# Patient Record
Sex: FEMALE | Race: WHITE | URBAN - METROPOLITAN AREA
[De-identification: names, ages, dates, MRNs, and addresses within clinical notes are randomized per-mention and may not be internally consistent; named-entity substitution may affect disease eponyms.]

---

## 2018-04-26 ENCOUNTER — APPOINTMENT (OUTPATIENT)
Dept: GENERAL RADIOLOGY | Facility: CLINIC | Age: 53
DRG: 871 | End: 2018-04-26
Attending: EMERGENCY MEDICINE
Payer: MEDICARE

## 2018-04-26 ENCOUNTER — HOSPITAL ENCOUNTER (INPATIENT)
Facility: CLINIC | Age: 53
LOS: 4 days | Discharge: HOME | DRG: 871 | End: 2018-04-30
Attending: EMERGENCY MEDICINE | Admitting: INTERNAL MEDICINE
Payer: MEDICARE

## 2018-04-26 DIAGNOSIS — J44.1 COPD EXACERBATION (H): ICD-10-CM

## 2018-04-26 DIAGNOSIS — J18.9 PNEUMONIA OF BOTH LUNGS DUE TO INFECTIOUS ORGANISM, UNSPECIFIED PART OF LUNG: ICD-10-CM

## 2018-04-26 DIAGNOSIS — J96.01 ACUTE RESPIRATORY FAILURE WITH HYPOXIA (H): ICD-10-CM

## 2018-04-26 LAB
ANION GAP SERPL CALCULATED.3IONS-SCNC: 10 MMOL/L (ref 3–14)
BASE EXCESS BLDA CALC-SCNC: 5.1 MMOL/L
BASOPHILS # BLD AUTO: 0 10E9/L (ref 0–0.2)
BASOPHILS NFR BLD AUTO: 0 %
BUN SERPL-MCNC: 12 MG/DL (ref 7–30)
CALCIUM SERPL-MCNC: 8.7 MG/DL (ref 8.5–10.1)
CHLORIDE SERPL-SCNC: 96 MMOL/L (ref 94–109)
CO2 SERPL-SCNC: 31 MMOL/L (ref 20–32)
CREAT SERPL-MCNC: 0.68 MG/DL (ref 0.52–1.04)
DIFFERENTIAL METHOD BLD: ABNORMAL
EOSINOPHIL # BLD AUTO: 0 10E9/L (ref 0–0.7)
EOSINOPHIL NFR BLD AUTO: 0 %
ERYTHROCYTE [DISTWIDTH] IN BLOOD BY AUTOMATED COUNT: 13.7 % (ref 10–15)
GFR SERPL CREATININE-BSD FRML MDRD: 89 ML/MIN/1.7M2
GLUCOSE SERPL-MCNC: 87 MG/DL (ref 70–99)
HCO3 BLD-SCNC: 31 MMOL/L (ref 21–28)
HCT VFR BLD AUTO: 40.6 % (ref 35–47)
HGB BLD-MCNC: 13.7 G/DL (ref 11.7–15.7)
LACTATE BLD-SCNC: 0.4 MMOL/L (ref 0.7–2)
LACTATE BLD-SCNC: 2.5 MMOL/L (ref 0.7–2)
LYMPHOCYTES # BLD AUTO: 3.2 10E9/L (ref 0.8–5.3)
LYMPHOCYTES NFR BLD AUTO: 16 %
MCH RBC QN AUTO: 31.4 PG (ref 26.5–33)
MCHC RBC AUTO-ENTMCNC: 33.7 G/DL (ref 31.5–36.5)
MCV RBC AUTO: 93 FL (ref 78–100)
METAMYELOCYTES # BLD: 0.2 10E9/L
METAMYELOCYTES NFR BLD MANUAL: 1 %
MONOCYTES # BLD AUTO: 1.2 10E9/L (ref 0–1.3)
MONOCYTES NFR BLD AUTO: 6 %
NEUTROPHILS # BLD AUTO: 15.2 10E9/L (ref 1.6–8.3)
NEUTROPHILS NFR BLD AUTO: 77 %
O2/TOTAL GAS SETTING VFR VENT: 6 %
OXYHGB MFR BLD: 87 % (ref 92–100)
PCO2 BLD: 48 MM HG (ref 35–45)
PH BLD: 7.41 PH (ref 7.35–7.45)
PLATELET # BLD AUTO: 680 10E9/L (ref 150–450)
PLATELET # BLD EST: ABNORMAL 10*3/UL
PO2 BLD: 54 MM HG (ref 80–105)
POTASSIUM SERPL-SCNC: 3.7 MMOL/L (ref 3.4–5.3)
PROCALCITONIN SERPL-MCNC: 0.11 NG/ML
RBC # BLD AUTO: 4.36 10E12/L (ref 3.8–5.2)
RBC MORPH BLD: ABNORMAL
SODIUM SERPL-SCNC: 137 MMOL/L (ref 133–144)
TROPONIN I SERPL-MCNC: <0.015 UG/L (ref 0–0.04)
WBC # BLD AUTO: 19.8 10E9/L (ref 4–11)

## 2018-04-26 PROCEDURE — 83605 ASSAY OF LACTIC ACID: CPT | Performed by: EMERGENCY MEDICINE

## 2018-04-26 PROCEDURE — 40000275 ZZH STATISTIC RCP TIME EA 10 MIN

## 2018-04-26 PROCEDURE — 99285 EMERGENCY DEPT VISIT HI MDM: CPT | Mod: 25

## 2018-04-26 PROCEDURE — 94640 AIRWAY INHALATION TREATMENT: CPT | Mod: 76

## 2018-04-26 PROCEDURE — 84484 ASSAY OF TROPONIN QUANT: CPT | Performed by: EMERGENCY MEDICINE

## 2018-04-26 PROCEDURE — 25000125 ZZHC RX 250: Performed by: EMERGENCY MEDICINE

## 2018-04-26 PROCEDURE — 36600 WITHDRAWAL OF ARTERIAL BLOOD: CPT

## 2018-04-26 PROCEDURE — 83605 ASSAY OF LACTIC ACID: CPT | Performed by: INTERNAL MEDICINE

## 2018-04-26 PROCEDURE — 85025 COMPLETE CBC W/AUTO DIFF WBC: CPT | Performed by: EMERGENCY MEDICINE

## 2018-04-26 PROCEDURE — 36415 COLL VENOUS BLD VENIPUNCTURE: CPT | Performed by: EMERGENCY MEDICINE

## 2018-04-26 PROCEDURE — 96375 TX/PRO/DX INJ NEW DRUG ADDON: CPT

## 2018-04-26 PROCEDURE — 96365 THER/PROPH/DIAG IV INF INIT: CPT

## 2018-04-26 PROCEDURE — 36415 COLL VENOUS BLD VENIPUNCTURE: CPT | Performed by: INTERNAL MEDICINE

## 2018-04-26 PROCEDURE — 87040 BLOOD CULTURE FOR BACTERIA: CPT | Performed by: EMERGENCY MEDICINE

## 2018-04-26 PROCEDURE — 12000000 ZZH R&B MED SURG/OB

## 2018-04-26 PROCEDURE — 25000125 ZZHC RX 250: Performed by: INTERNAL MEDICINE

## 2018-04-26 PROCEDURE — 84145 PROCALCITONIN (PCT): CPT | Performed by: EMERGENCY MEDICINE

## 2018-04-26 PROCEDURE — 96366 THER/PROPH/DIAG IV INF ADDON: CPT

## 2018-04-26 PROCEDURE — 94640 AIRWAY INHALATION TREATMENT: CPT

## 2018-04-26 PROCEDURE — 25000128 H RX IP 250 OP 636: Performed by: EMERGENCY MEDICINE

## 2018-04-26 PROCEDURE — 40000274 ZZH STATISTIC RCP CONSULT EA 30 MIN

## 2018-04-26 PROCEDURE — 25000128 H RX IP 250 OP 636: Performed by: INTERNAL MEDICINE

## 2018-04-26 PROCEDURE — 82805 BLOOD GASES W/O2 SATURATION: CPT | Performed by: INTERNAL MEDICINE

## 2018-04-26 PROCEDURE — 87631 RESP VIRUS 3-5 TARGETS: CPT | Performed by: INTERNAL MEDICINE

## 2018-04-26 PROCEDURE — 71046 X-RAY EXAM CHEST 2 VIEWS: CPT

## 2018-04-26 PROCEDURE — 80048 BASIC METABOLIC PNL TOTAL CA: CPT | Performed by: EMERGENCY MEDICINE

## 2018-04-26 PROCEDURE — 99223 1ST HOSP IP/OBS HIGH 75: CPT | Mod: AI | Performed by: INTERNAL MEDICINE

## 2018-04-26 RX ORDER — ONDANSETRON 2 MG/ML
4 INJECTION INTRAMUSCULAR; INTRAVENOUS EVERY 6 HOURS PRN
Status: DISCONTINUED | OUTPATIENT
Start: 2018-04-26 | End: 2018-04-30 | Stop reason: HOSPADM

## 2018-04-26 RX ORDER — ALBUTEROL SULFATE 90 UG/1
1-2 AEROSOL, METERED RESPIRATORY (INHALATION) EVERY 4 HOURS PRN
Status: ON HOLD | COMMUNITY
End: 2018-04-30

## 2018-04-26 RX ORDER — METHYLPREDNISOLONE SODIUM SUCCINATE 125 MG/2ML
125 INJECTION, POWDER, LYOPHILIZED, FOR SOLUTION INTRAMUSCULAR; INTRAVENOUS ONCE
Status: COMPLETED | OUTPATIENT
Start: 2018-04-26 | End: 2018-04-26

## 2018-04-26 RX ORDER — PROCHLORPERAZINE MALEATE 5 MG
10 TABLET ORAL EVERY 6 HOURS PRN
Status: DISCONTINUED | OUTPATIENT
Start: 2018-04-26 | End: 2018-04-30 | Stop reason: HOSPADM

## 2018-04-26 RX ORDER — NALOXONE HYDROCHLORIDE 0.4 MG/ML
.1-.4 INJECTION, SOLUTION INTRAMUSCULAR; INTRAVENOUS; SUBCUTANEOUS
Status: DISCONTINUED | OUTPATIENT
Start: 2018-04-26 | End: 2018-04-30 | Stop reason: HOSPADM

## 2018-04-26 RX ORDER — CEFTRIAXONE 1 G/1
1 INJECTION, POWDER, FOR SOLUTION INTRAMUSCULAR; INTRAVENOUS ONCE
Status: COMPLETED | OUTPATIENT
Start: 2018-04-26 | End: 2018-04-26

## 2018-04-26 RX ORDER — SODIUM CHLORIDE 9 MG/ML
INJECTION, SOLUTION INTRAVENOUS CONTINUOUS
Status: DISCONTINUED | OUTPATIENT
Start: 2018-04-26 | End: 2018-04-30 | Stop reason: HOSPADM

## 2018-04-26 RX ORDER — PROCHLORPERAZINE 25 MG
25 SUPPOSITORY, RECTAL RECTAL EVERY 12 HOURS PRN
Status: DISCONTINUED | OUTPATIENT
Start: 2018-04-26 | End: 2018-04-30 | Stop reason: HOSPADM

## 2018-04-26 RX ORDER — ONDANSETRON 4 MG/1
4 TABLET, ORALLY DISINTEGRATING ORAL EVERY 6 HOURS PRN
Status: DISCONTINUED | OUTPATIENT
Start: 2018-04-26 | End: 2018-04-30 | Stop reason: HOSPADM

## 2018-04-26 RX ORDER — ACETAMINOPHEN 325 MG/1
650 TABLET ORAL EVERY 4 HOURS PRN
Status: DISCONTINUED | OUTPATIENT
Start: 2018-04-26 | End: 2018-04-30 | Stop reason: HOSPADM

## 2018-04-26 RX ORDER — IPRATROPIUM BROMIDE AND ALBUTEROL SULFATE 2.5; .5 MG/3ML; MG/3ML
6 SOLUTION RESPIRATORY (INHALATION) ONCE
Status: COMPLETED | OUTPATIENT
Start: 2018-04-26 | End: 2018-04-26

## 2018-04-26 RX ORDER — OXYCODONE HYDROCHLORIDE 5 MG/1
5-10 TABLET ORAL
Status: DISCONTINUED | OUTPATIENT
Start: 2018-04-26 | End: 2018-04-27 | Stop reason: ALTCHOICE

## 2018-04-26 RX ORDER — ALBUTEROL SULFATE 0.83 MG/ML
3 SOLUTION RESPIRATORY (INHALATION)
Status: DISCONTINUED | OUTPATIENT
Start: 2018-04-26 | End: 2018-04-30 | Stop reason: HOSPADM

## 2018-04-26 RX ORDER — IPRATROPIUM BROMIDE AND ALBUTEROL SULFATE 2.5; .5 MG/3ML; MG/3ML
3 SOLUTION RESPIRATORY (INHALATION) 4 TIMES DAILY
Status: DISCONTINUED | OUTPATIENT
Start: 2018-04-26 | End: 2018-04-30 | Stop reason: HOSPADM

## 2018-04-26 RX ORDER — FLUTICASONE PROPIONATE 50 MCG
2 SPRAY, SUSPENSION (ML) NASAL 2 TIMES DAILY PRN
COMMUNITY

## 2018-04-26 RX ORDER — HYDROMORPHONE HYDROCHLORIDE 1 MG/ML
.3-.5 INJECTION, SOLUTION INTRAMUSCULAR; INTRAVENOUS; SUBCUTANEOUS
Status: DISCONTINUED | OUTPATIENT
Start: 2018-04-26 | End: 2018-04-30 | Stop reason: HOSPADM

## 2018-04-26 RX ORDER — SODIUM CHLORIDE 9 MG/ML
1000 INJECTION, SOLUTION INTRAVENOUS CONTINUOUS
Status: DISCONTINUED | OUTPATIENT
Start: 2018-04-26 | End: 2018-04-26

## 2018-04-26 RX ORDER — AZITHROMYCIN 250 MG/1
250 TABLET, FILM COATED ORAL DAILY
Status: COMPLETED | OUTPATIENT
Start: 2018-04-27 | End: 2018-04-30

## 2018-04-26 RX ORDER — OXYCODONE AND ACETAMINOPHEN 10; 325 MG/1; MG/1
1 TABLET ORAL EVERY 8 HOURS PRN
COMMUNITY

## 2018-04-26 RX ORDER — ALBUTEROL SULFATE 0.83 MG/ML
2.5 SOLUTION RESPIRATORY (INHALATION)
Status: DISCONTINUED | OUTPATIENT
Start: 2018-04-26 | End: 2018-04-26

## 2018-04-26 RX ADMIN — METHYLPREDNISOLONE SODIUM SUCCINATE 125 MG: 125 INJECTION, POWDER, FOR SOLUTION INTRAMUSCULAR; INTRAVENOUS at 18:00

## 2018-04-26 RX ADMIN — SODIUM CHLORIDE 1000 ML: 9 INJECTION, SOLUTION INTRAVENOUS at 20:00

## 2018-04-26 RX ADMIN — IPRATROPIUM BROMIDE AND ALBUTEROL SULFATE 6 ML: .5; 3 SOLUTION RESPIRATORY (INHALATION) at 17:09

## 2018-04-26 RX ADMIN — CEFTRIAXONE SODIUM 1 G: 1 INJECTION, POWDER, FOR SOLUTION INTRAMUSCULAR; INTRAVENOUS at 18:00

## 2018-04-26 RX ADMIN — ENOXAPARIN SODIUM 40 MG: 40 INJECTION SUBCUTANEOUS at 22:36

## 2018-04-26 RX ADMIN — AZITHROMYCIN MONOHYDRATE 500 MG: 500 INJECTION, POWDER, LYOPHILIZED, FOR SOLUTION INTRAVENOUS at 17:22

## 2018-04-26 RX ADMIN — SODIUM CHLORIDE 1500 ML: 9 INJECTION, SOLUTION INTRAVENOUS at 21:37

## 2018-04-26 RX ADMIN — SODIUM CHLORIDE 1000 ML: 9 INJECTION, SOLUTION INTRAVENOUS at 17:04

## 2018-04-26 RX ADMIN — CEFEPIME HYDROCHLORIDE 2 G: 2 INJECTION, POWDER, FOR SOLUTION INTRAVENOUS at 22:35

## 2018-04-26 RX ADMIN — IPRATROPIUM BROMIDE AND ALBUTEROL SULFATE 3 ML: .5; 3 SOLUTION RESPIRATORY (INHALATION) at 22:08

## 2018-04-26 NOTE — IP AVS SNAPSHOT
[place] [address] [facility]    [address]    [facility] [address]    Phone:  [phone]    Fax:  [phone]                                       After Visit Summary   4/26/2018    Nayeli Blas    MRN: [id number]           After Visit Summary Signature Page     I have received my discharge instructions, and my questions have been answered. I have discussed any challenges I see with this plan with the nurse or doctor.    ..........................................................................................................................................  Patient/Patient Representative Signature      ..........................................................................................................................................  Patient Representative Print Name and Relationship to Patient    ..................................................               ................................................  Date                                            Time    ..........................................................................................................................................  Reviewed by Signature/Title    ...................................................              ..............................................  Date                                                            Time

## 2018-04-26 NOTE — LETTER
Transition Communication Hand-off for Care Transitions to Next Level of Care Provider    Name: Nayeli Blas  : 1965  MRN #: [id number]  Primary Care Provider: ADDIE BELL  Primary Care MD Name: Addie Bell  Primary Clinic: [facility] [address] [place] [address]  Primary Care [facility] Name: [facility] [place]  Reason for Hospitalization:  COPD exacerbation (H) [J44.1]  Acute respiratory failure with hypoxia (H) [J96.01]  Pneumonia of both lungs due to infectious organism, unspecified part of lung [J18.9]  Admit Date/Time: 2018  3:36 PM  Discharge Date: 18  Payor Source: Payor: MEDICARE / Plan: MEDICARE / Product Type: Medicare /     Readmission Assessment Measure (CHERELLE) Risk Score/category: AVERAGE           Reason for Communication Hand-off Referral: Admission diagnoses: PN  Admission diagnoses: COPD  Non-adherence to plan of care related to:  Other declined smoking cessation    Discharge Plan:       Concern for non-adherence with plan of care:   YES  Discharge Needs Assessment:  Needs       Most Recent Value    # of Referrals Placed by CTS Scheduled Follow-up appointments          Already enrolled in Tele-monitoring program and name of program:  na  Follow-up specialty is recommended: No    Follow-up plan:  No future appointments.    Any outstanding tests or procedures:    Procedures     Future Labs/Procedures    Oxygen Adult     Comments:    [employer] [facility] Order 1-2 liter(s) by nasal cannula with activity with use of portable tank. Expected treatment length is 1 months.. Test on conserving device as applicable.    Patients who qualify for home O2 coverage under the CMS guidelines require ABG tests or O2 sat readings obtained closest to, but no earlier than 2 days prior to the discharge, as evidence of the need for home oxygen therapy. Testing must be performed while patient is in the chronic stable state. See notes for O2 sats.    I certify that this patient, Nayeli  RACH Blas has been under my care and that I, or a nurse practitioner or physician's assistant working with me, had a face-to-face encounter that meets the face-to-face encounter requirements with this patient on 4/30/2018. The patient, Nayeli Blas was evaluated or treated in whole, or in part, for the following medical condition, which necessitates the use of the ordered oxygen. Treatment Diagnosis: Acute respiratory failure with hypoxia. suspect COPD     Attending Provider: Swati Gandara MD  Physician signature: See electronic signature associated with these discharge orders  Date of Order: April 30, 2018              Follow-up and recommended labs and tests        Follow up with primary care provider, ADDIE MALONEY, within 7 days                     Further instructions from your care team      Your hospital follow up appointment has been schedule for you with Dr. Maloney at [facility] for Wednesday 5/9/18 at 1:40pm. Please bring your hospital discharge instructions and any new medications, your insurance cared and photo ID with you to your appointment. Please call the clinic at [phone] if you need to reschedule.     Oxygen Provider:  Arranged through [employer], contact number [phone]. If you have any questions or concerns please call the oxygen company directly       Key Recommendations: pt admitted with pna/copd exacerbation. Pt states she had gone into her PCP clinic when she was first sick but saw an on call doctor and wasn't prescribed any antibiotics. Pt has a history of COPD and is a smoker, although she states she hasn't smoked in about a month and plans to quit. PNA and COPD action plan were reviewed and given to the pt. Pt states she doesn't have any standing orders for antibiotics or steroids for when she has a flare up, maybe this could be a possibility for the future to hopefully prevent another hospital admission? dc'd on [place]  02 through [facility].     Natalie Linda    AVS/Discharge Summary is the source of truth; this is a helpful guide for improved communication of patient story

## 2018-04-26 NOTE — LETTER
Key Recommendations: pt admitted with pna/copd exacerbation. Pt states she had gone into her PCP clinic when she was first sick but saw an on call doctor and wasn't prescribed any antibiotics. Pt has a history of COPD and is a smoker, although she states she hasn't smoked in about a month and plans to quit. PNA and COPD action plan were reviewed and given to the pt. Pt states she doesn't have any standing orders for antibiotics or steroids for when she has a flare up, maybe this could be a possibility for the future to hopefully prevent another hospital admission?    Natalie Linda    AVS/Discharge Summary is the source of truth; this is a helpful guide for improved communication of patient story

## 2018-04-26 NOTE — ED PROVIDER NOTES
History     Chief Complaint:  Cough    HPI   Nayeli Blas is a 52 year old female who presents with her boyfriend for evaluation of cough, shortness of breath, hypoxia.  She has a history of smoking and COPD but is not on any long-term controller medications or baseline oxygen.  She got sick about 10-14 days ago with a productive cough.  She was seen her primary care clinic and at that time had wheezy lung sounds but apparently no clinical exam findings to suggest pneumonia.  She was treated with bronchodilators and cough medications.  She says she initially felt better but that has been getting worse for the past several days.  She has been taking her cough medicine thinking it was an antibiotic.  She was in her doctor's office for another checkup today and was noted to be hypoxic.  They obtained a chest x-ray, findings noted below, but were not able to view the pictures.  Her doctor also started her on oxygen and had her brought to the ER by EMS.    Patient says she she has had an ongoing cough with production of phlegm.  Some chills but no objective fevers.  Mild chest pain with coughing.  No vomiting or diarrhea.  She does have a sore throat from coughing.      CXR PA and Lateral 4/26/18  FINDINGS:  2 views obtained. Emphysematous changes noted throughout bilateral lungs. Superimposed lingular, right middle lobe, and bilateral lung base opacities, worrisome for infection. Throughout the remainder of the bilateral lungs, more-diffuse reticulonodular opacities are identified, worrisome for superimposed respiratory bronchiolitis. No pneumothorax. No Kerley-B lines identified to suggest pulmonary edema. No drainable pleural effusions bilaterally. Heart size stable. No acute fracture.    Allergies:  Sulfa Drugs  Penicillins       Medications:    The patient is not currently taking any prescribed medications.    Past Medical History:    No significant past medical history.     Past Surgical History:     Appendectomy  Cholecystectomy    Family History:    No pertinent family history.    Social History:  Smoking status: Never smoker  Alcohol use: No  Marital Status:  Single      Review of Systems  As noted above, otherwise negative.  No history of immunosuppression or cancer or diabetes.  No hemoptysis. no leg swelling    Physical Exam     Patient Vitals for the past 24 hrs:   BP Temp Temp src Heart Rate Resp SpO2 Weight   04/26/18 1800 124/60 - - 87 19 95 % -   04/26/18 1745 129/58 - - 88 22 94 % -   04/26/18 1730 103/72 - - 87 23 93 % -   04/26/18 1715 102/90 - - 88 28 94 % 75.8 kg (167 lb)   04/26/18 1709 - - - - - 95 % -   04/26/18 1700 120/61 - - 88 23 97 % -   04/26/18 1645 - - - - - 100 % -   04/26/18 1630 (!) 73/43 - - - - 99 % -   04/26/18 1615 - - - - - 96 % -   04/26/18 1600 - - - - - 94 % -   04/26/18 1545 133/78 98.3  F (36.8  C) Oral 93 20 96 % -         Physical Exam  Constitutional:  Appears well-developed and well-nourished. Alert. Conversant. Non toxic.  HENT:   Head: Atraumatic.   Nose: Nose normal.  Mouth/Throat: Oral mucosa is clear and moist. no trismus. Pharynx normal. Tonsils symmetric. No tonsillar enlargement, erythema, or exudate.  Eyes: Conjunctivae normal. EOM normal. Pupils equal, round, and reactive to light. No scleral icterus.   Neck: Normal range of motion. Neck supple. No tracheal deviation present.   Cardiovascular: Normal rate, regular rhythm. No gallop. No friction rub. No murmur heard. Symmetric radial artery pulses   Pulmonary/Chest: Effort normal. No stridor.  On oxygen-5 L and breathing comfortably.  She is able to speak sentences.  She has bilateral apices or wheezes in bilateral basilar coarse rales, I feel slightly more on the right on the left.  Abdominal: Soft. Bowel sounds normal. No distension. No mass. No tenderness. No rebound. No guarding.   Musculoskeletal:   RUE: Normal range of motion. No tenderness. No deformity  LUE: Normal range of motion. No tenderness. No  deformity  RLE: Normal range of motion. No edema. No tenderness. No deformity  LLE: Normal range of motion. No edema. No tenderness. No deformity  Lymph: No cervical adenopathy.   Neurological: Alert and oriented to person, place, and time. Normal strength. CN II-VII intact. No sensory deficit. GCS eye subscore is 4. GCS verbal subscore is 5. GCS motor subscore is 6. Normal coordination   Skin: Skin is warm and dry. No rash noted. No pallor. Normal capillary refill.  Psychiatric:  Normal mood. Normal affect.       Emergency Department Course   Laboratory:  CBC:  WBC 19.8 (H), HGB 13.7,  (H),  BMP: WNL (Creatinine 0.68)  (1612) Troponin I: <0.015  (1612) Lactic Acid: 2.5 (H)  Blood culture #1: pending  Blood culture #2: pending    Interventions:  (1704) Normal Saline, 1 liter, IV bolus  (1709) Albuterol-Ipratropium inhalation solution, 2.5 mg-0.5 mg/3 ml; 6 mL, inhalation  Recheck-still slight wheezing but overall better aeration.  Still has bilateral rales/rhonchi  (1725) Azithromycin, 500 mg, IV infusion  (1800) Ceftriaxone, 1 g, IV infusion  (1800) Solu-Medrol, 125 mg, IV injection    Emergency Department Course:  Nursing notes and vitals reviewed.  I performed an exam of the patient as documented above.  Blood was drawn from the patient. This was sent for laboratory testing, findings above.   1843: I spoke with Dr. Lamar of the Hospitalist service regarding patient's presentation, findings, and plan of care.  1850: Patient rechecked and updated.   Findings and plan explained to the Patient who consents to admission. Discussed the patient with Dr. Lamar, who will admit the patient to a medical bed for further monitoring, evaluation, and treatment.    Impression & Plan    Medical Decision Making:  Nayeli Blas is a 52 year old female sent to the evaluation of pneumonia.  She has had a cough for the past couple of weeks getting progressively worse and was found to be hypoxic in clinic today.     On my  "exam the patient was not really wheezing but also had some focal consolidation.  I felt she likely had a COPD exacerbation overlying her pneumonia.  We treated her COPD with nebulizers and steroids and she somewhat improved although still requiring oxygen.  Labs show elevated white count consistent with an infectious process and mildly elevated lactic acid at 2.5.  Other than hypoxia she was hemodynamically stable and clinically not in distress.  I do note one spurious hypertensive blood pressure reading.  We administered IV fluids, IV Rocephin and azithromycin, obtain blood cultures.  Given her hypoxia she will require admission for ongoing therapy.    We made efforts to get the patient's x-ray from clinic \"pushed\" to our PACS  so we can review the pictures.  Ultimately this was not successful.  I did order an x-ray to be done here in the ER to confirm the diagnosis and make sure we have a baseline in our system.      Diagnosis:    ICD-10-CM    1. Pneumonia of both lungs due to infectious organism, unspecified part of lung J18.9    2. COPD exacerbation (H) J44.1    3. Acute respiratory failure with hypoxia (H) J96.01      Disposition:  Admitted    I, Jesse Dill, am serving as a scribe on 4/26/2018  to personally document services performed by Dr. High based on my observations and the provider's statements to me.       Juan Daniel High  4/26/2018   [facility] EMERGENCY DEPARTMENT       Juan Daniel High MD  04/28/18 0012    "

## 2018-04-26 NOTE — IP AVS SNAPSHOT
MRN:[id number]                      After Visit Summary   4/26/2018    Nayeli Blas    MRN: [id number]           Thank you!     Thank you for choosing [facility] for your care. Our goal is always to provide you with excellent care. Hearing back from our patients is one way we can continue to improve our services. Please take a few minutes to complete the written survey that you may receive in the mail after you visit. If you would like to speak to someone directly about your visit please contact Patient Relations at [phone]. Thank you!          Patient Information     Date Of Birth          1965        Designated Caregiver       Most Recent Value    Caregiver    Will someone help with your care after discharge? yes    Name of designated caregiver Derik Cabrera    Phone number of caregiver [phone]    Caregiver address [address] [facility] [address] [id number]      About your hospital stay     You were admitted on:  April 26, 2018 You last received care in the:  [place] [address] [facility]    You were discharged on:  April 30, 2018        Reason for your hospital stay       Shortness of breath and wheezing                  Who to Call     For medical emergencies, please call 911.  For non-urgent questions about your medical care, please call your primary care provider or clinic, [phone]          Attending Provider     Provider Specialty    Juan Daniel High MD Emergency Medicine    Willard, Vicente Mac MD Internal Medicine    Carlos Mosquera,  Internal Medicine       Primary Care Provider Office Phone # Fax #    Yvette Conroy [facility] [phone]      After Care Instructions     Activity       Your activity upon discharge: activity as tolerated            Diet       Follow this diet upon discharge: Orders Placed This Encounter      Combination Diet Regular Diet Adult            Oxygen Adult       [facility] Order 1-2 liter(s) by  nasal cannula with activity with use of portable tank. Expected treatment length is 1 months.. Test on conserving device as applicable.    Patients who qualify for home O2 coverage under the CMS guidelines require ABG tests or O2 sat readings obtained closest to, but no earlier than 2 days prior to the discharge, as evidence of the need for home oxygen therapy. Testing must be performed while patient is in the chronic stable state. See notes for O2 sats.    I certify that this patient, Nayeli Blas has been under my care and that I, or a nurse practitioner or physician's assistant working with me, had a face-to-face encounter that meets the face-to-face encounter requirements with this patient on 4/30/2018. The patient, Nayeli Blas was evaluated or treated in whole, or in part, for the following medical condition, which necessitates the use of the ordered oxygen. Treatment Diagnosis: Acute respiratory failure with hypoxia. suspect COPD     Attending Provider: Swati Gandara MD  Physician signature: See electronic signature associated with these discharge orders  Date of Order: April 30, 2018                  Follow-up Appointments     Follow-up and recommended labs and tests        Follow up with primary care provider, ADDIE MALONEY, within 7 days                  Further instructions from your care team       Your hospital follow up appointment has been schedule for you with Dr. Maloney at [facility] for Wednesday 5/9/18 at 1:40pm. Please bring your hospital discharge instructions and any new medications, your insurance cared and photo ID with you to your appointment. Please call the clinic at [phone] if you need to reschedule.    Oxygen Provider:  Arranged through [employer], contact number [phone]. If you have any questions or concerns please call the oxygen company directly.      Pending Results     Date and Time Order Name Status  "Description    2018 1730 Blood culture Preliminary     2018 1718 Blood culture Preliminary             Statement of Approval     Ordered          18 2907  I have reviewed and agree with all the recommendations and orders detailed in this document.  EFFECTIVE NOW     Approved and electronically signed by:  Swati Gandara MD             Admission Information     Date & Time Provider Department Dept. Phone    2018 Carlos Mosquera, DO [place] [address] [facility] [phone]      Your Vitals Were     Blood Pressure Pulse Temperature Respirations Height Weight    150/70 (BP Location: Right arm) 87 98  F (36.7  C) (Oral) 18 1.626 m (5' 4\") 77.7 kg (171 lb 4.8 oz)    Pulse Oximetry BMI (Body Mass Index)                92% 29.4 kg/m2          SleepOut Information     SleepOut lets you send messages to your doctor, view your test results, renew your prescriptions, schedule appointments and more. To sign up, go to [email or web address] . Click on \"Log in\" on the left side of the screen, which will take you to the Welcome page. Then click on \"Sign up Now\" on the right side of the page.     You will be asked to enter the access code listed below, as well as some personal information. Please follow the directions to create your username and password.     Your access code is: [email or web address]  Expires: 2018  3:16 PM     Your access code will  in 90 days. If you need help or a new code, please call your [facility] clinic or [phone].        Care EveryWhere ID     This is your Care EveryWhere ID. This could be used by other organizations to access your [facility] medical records  [id number]        Equal Access to Services     [facility]: Hadvania Euceda, wacorazonda luanjaliadaha, qaybta kaalmada patria, destiny nathan. So [facility] [phone].    ATENCIÓN: Si habla español, tiene a adams disposición servicios gratuitos de asistencia lingüística. " Yennifer galaviz [phone].    We comply with applicable federal civil rights laws and [place] laws. We do not discriminate on the basis of race, color, national origin, age, disability, sex, sexual orientation, or gender identity.               Review of your medicines      START taking        Dose / Directions    predniSONE 20 MG tablet   Commonly known as:  DELTASONE   Used for:  Acute respiratory failure with hypoxia (H)        Take  2 tabs (40 mg) daily x 3 days, 1 tab (20 mg) daily x 3 days, then 1/2 tab (10 mg) x 3 days.   Quantity:  11 tablet   Refills:  0         CONTINUE these medicines which have NOT CHANGED        Dose / Directions    albuterol 108 (90 Base) MCG/ACT Inhaler   Commonly known as:  PROAIR HFA/PROVENTIL HFA/VENTOLIN HFA   Used for:  Acute respiratory failure with hypoxia (H)        Dose:  1-2 puff   Inhale 1-2 puffs into the lungs every 4 hours as needed for shortness of breath / dyspnea or wheezing   Quantity:  1 Inhaler   Refills:  0       CELEXA PO        Dose:  30 mg   Take 30 mg by mouth daily   Refills:  0       FLEXERIL PO        Dose:  20 mg   Take 20 mg by mouth At Bedtime   Refills:  0       fluticasone 50 MCG/ACT spray   Commonly known as:  FLONASE        Dose:  2 spray   Spray 2 sprays into both nostrils 2 times daily as needed for rhinitis or allergies   Refills:  0       naloxone nasal spray   Commonly known as:  NARCAN        Dose:  4 mg   Spray 4 mg into one nostril alternating nostrils as needed for opioid reversal every 2-3 minutes until assistance arrives   Refills:  0       oxyCODONE-acetaminophen  MG per tablet   Commonly known as:  PERCOCET        Dose:  1 tablet   Take 1 tablet by mouth every 8 hours as needed for severe pain   Refills:  0       PRILOSEC PO        Dose:  20 mg   Take 20 mg by mouth every morning   Refills:  0       SEROQUEL PO        Dose:  400 mg   Take 400 mg by mouth At Bedtime   Refills:  0       XANAX PO        Dose:  0.5 mg   Take 0.5 mg by  mouth 4 times daily as needed for anxiety   Refills:  0            Where to get your medicines      These medications were sent to [facility] [place], [place] - [address]  [address] [address]     Phone:  [phone]     albuterol 108 (90 Base) MCG/ACT Inhaler    predniSONE 20 MG tablet                Protect others around you: Learn how to safely use, store and throw away your medicines at www.disposemymeds.org.        Information about OPIOIDS     PRESCRIPTION OPIOIDS: WHAT YOU NEED TO KNOW   You have a prescription for an opioid (narcotic) pain medicine. Opioids can cause addiction. If you have a history of chemical dependency of any type, you are at a higher risk of becoming addicted to opioids. Only take this medicine after all other options have been tried. Take it for as short a time and as few doses as possible.     Do not:    Drive. If you drive while taking these medicines, you could be arrested for driving under the influence (DUI).    Operate heavy machinery    Do any other dangerous activities while taking these medicines.     Drink any alcohol while taking these medicines.      Take with any other medicines that contain acetaminophen. Read all labels carefully. Look for the word  acetaminophen  or  Tylenol.  Ask your pharmacist if you have questions or are unsure.    Store your pills in a secure place, locked if possible. We will not replace any lost or stolen medicine. If you don t finish your medicine, please throw away (dispose) as directed by your pharmacist. The [place] Pollution Control Agency has more information about safe disposal: https://www.pca.[facility].[place].us/living-green/managing-unwanted-medications    All opioids tend to cause constipation. Drink plenty of water and eat foods that have a lot of fiber, such as fruits, vegetables, prune juice, apple juice and high-fiber cereal. Take a laxative (Miralax, milk of magnesia, Colace, Senna) if you don t move your bowels  at least every other day.              Medication List: This is a list of all your medications and when to take them. Check marks below indicate your daily home schedule. Keep this list as a reference.      Medications           Morning Afternoon Evening Bedtime As Needed    albuterol 108 (90 Base) MCG/ACT Inhaler   Commonly known as:  PROAIR HFA/PROVENTIL HFA/VENTOLIN HFA   Inhale 1-2 puffs into the lungs every 4 hours as needed for shortness of breath / dyspnea or wheezing                                CELEXA PO   Take 30 mg by mouth daily                                FLEXERIL PO   Take 20 mg by mouth At Bedtime   Last time this was given:  10 mg on 4/29/2018 10:29 PM                                fluticasone 50 MCG/ACT spray   Commonly known as:  FLONASE   Spray 2 sprays into both nostrils 2 times daily as needed for rhinitis or allergies                                naloxone nasal spray   Commonly known as:  NARCAN   Spray 4 mg into one nostril alternating nostrils as needed for opioid reversal every 2-3 minutes until assistance arrives                                oxyCODONE-acetaminophen  MG per tablet   Commonly known as:  PERCOCET   Take 1 tablet by mouth every 8 hours as needed for severe pain   Last time this was given:  1 tablet on 4/30/2018  4:30 PM                                predniSONE 20 MG tablet   Commonly known as:  DELTASONE   Take  2 tabs (40 mg) daily x 3 days, 1 tab (20 mg) daily x 3 days, then 1/2 tab (10 mg) x 3 days.   Last time this was given:  40 mg on 4/30/2018  8:22 AM                                PRILOSEC PO   Take 20 mg by mouth every morning   Last time this was given:  20 mg on 4/30/2018  8:23 AM                                SEROQUEL PO   Take 400 mg by mouth At Bedtime   Last time this was given:  400 mg on 4/29/2018 10:29 PM                                XANAX PO   Take 0.5 mg by mouth 4 times daily as needed for anxiety   Last time this was given:  0.5 mg on  4/30/2018  4:28 PM

## 2018-04-26 NOTE — ED NOTES
[facility]  ED Nurse Handoff Report    Nayeli Blas is a 52 year old female   ED Chief complaint: Cough (Pneumonia and SOB)  . ED Diagnosis:   Final diagnoses:   Pneumonia of both lungs due to infectious organism, unspecified part of lung   COPD exacerbation (H)   Acute respiratory failure with hypoxia (H)     Allergies:   Allergies   Allergen Reactions     Sulfa Drugs Anaphylaxis     Penicillins Rash       Code Status: Full Code  Activity level - Baseline/Home:  Independent. Activity Level - Current:   Independent. Lift room needed: No. Bariatric: No   Needed: No   Isolation: No. Infection: Not Applicable  Other .     Vital Signs:   Vitals:    04/26/18 1715 04/26/18 1730 04/26/18 1745 04/26/18 1800   BP: 102/90 103/72 129/58 124/60   Resp: 28 23 22 19   Temp:       TempSrc:       SpO2: 94% 93% 94% 95%   Weight: 75.8 kg (167 lb)          Cardiac Rhythm:  ,    Reg  Pain level:  zero  Patient confused: No. Patient Falls Risk: Yes.   Elimination Status: Able to void   Patient Report - Initial Complaint: SOB, ALEGRIA, Cough.   Focused Assessment:  Nayeli Blas is a 52 year old female who presents with SOB, ALEGRIA, and was seen in clinic last week for possibility of pneumonia.  No abx prescribed at that time.  Now arrives to ED today for worsening cough, SOB, ALEGRIA, and general malaise.  Tests Performed:   XR Chest 2 Views    (Results Pending)   . Abnormal Results:   Labs Ordered and Resulted from Time of ED Arrival Up to the Time of Departure from the ED   CBC WITH PLATELETS DIFFERENTIAL - Abnormal; Notable for the following:        Result Value    WBC 19.8 (*)     Platelet Count 680 (*)     Absolute Neutrophil 15.2 (*)     Absolute Metamyelocytes 0.2 (*)     All other components within normal limits   LACTIC ACID WHOLE BLOOD - Abnormal; Notable for the following:     Lactic Acid 2.5 (*)     All other components within normal limits   BASIC METABOLIC PANEL   TROPONIN I   BLOOD GAS ARTERIAL AND  OXYHGB   PERIPHERAL IV CATHETER   BLOOD CULTURE   BLOOD CULTURE   .   Treatments provided: Abx, Fluids  Family Comments: Spouse at bedside, supportive  OBS brochure/video discussed/provided to patient:  N/A  ED Medications:   Medications   0.9% sodium chloride BOLUS (1,000 mLs Intravenous New Bag 4/26/18 1704)     Followed by   sodium chloride 0.9% infusion (not administered)   albuterol neb solution 2.5 mg (not administered)   ipratropium - albuterol 0.5 mg/2.5 mg/3 mL (DUONEB) neb solution 6 mL (6 mLs Nebulization Given 4/26/18 1709)   methylPREDNISolone sodium succinate (solu-MEDROL) injection 125 mg (125 mg Intravenous Given 4/26/18 1800)   cefTRIAXone (ROCEPHIN) 1 g vial to attach to  mL bag for ADULTS or NS 50 mL bag for PEDS (1 g Intravenous New Bag 4/26/18 1800)   azithromycin (ZITHROMAX) 500 mg in sodium chloride 0.9 % 250 mL intermittent infusion (500 mg Intravenous Canceled Entry 4/26/18 1725)     Drips infusing:  Yes  For the majority of the shift, the patient's behavior Green.     Severe Sepsis OR Septic Shock Diagnosis Present: No      ED Nurse Name/Phone Number: Denisevenkata Hong,   6:38 PM    RECEIVING UNIT ED HANDOFF REVIEW    Above ED Nurse Handoff Report was reviewed: Yes  Reviewed by: Tami Means on April 26, 2018 at 7:26 PM

## 2018-04-26 NOTE — ED TRIAGE NOTES
Pt dx with pneumonia at [facility] ~ one week ago.  Saw Dr. Cornell, started on abx.  Over the course of this week, pt experienced increased SOB, ALEGRIA, and general malaise.  Returned to ED today for difficulty breathing.

## 2018-04-27 LAB
ANION GAP SERPL CALCULATED.3IONS-SCNC: 8 MMOL/L (ref 3–14)
BUN SERPL-MCNC: 12 MG/DL (ref 7–30)
CALCIUM SERPL-MCNC: 8 MG/DL (ref 8.5–10.1)
CHLORIDE SERPL-SCNC: 105 MMOL/L (ref 94–109)
CO2 SERPL-SCNC: 26 MMOL/L (ref 20–32)
CREAT SERPL-MCNC: 0.54 MG/DL (ref 0.52–1.04)
ERYTHROCYTE [DISTWIDTH] IN BLOOD BY AUTOMATED COUNT: 14 % (ref 10–15)
FLUAV+FLUBV RNA SPEC QL NAA+PROBE: NEGATIVE
FLUAV+FLUBV RNA SPEC QL NAA+PROBE: NEGATIVE
GFR SERPL CREATININE-BSD FRML MDRD: >90 ML/MIN/1.7M2
GLUCOSE SERPL-MCNC: 113 MG/DL (ref 70–99)
GRAM STN SPEC: NORMAL
HCT VFR BLD AUTO: 37.9 % (ref 35–47)
HGB BLD-MCNC: 12.6 G/DL (ref 11.7–15.7)
Lab: NORMAL
MCH RBC QN AUTO: 32.1 PG (ref 26.5–33)
MCHC RBC AUTO-ENTMCNC: 33.2 G/DL (ref 31.5–36.5)
MCV RBC AUTO: 96 FL (ref 78–100)
PLATELET # BLD AUTO: 594 10E9/L (ref 150–450)
POTASSIUM SERPL-SCNC: 4.3 MMOL/L (ref 3.4–5.3)
RBC # BLD AUTO: 3.93 10E12/L (ref 3.8–5.2)
RSV RNA SPEC NAA+PROBE: NEGATIVE
SODIUM SERPL-SCNC: 139 MMOL/L (ref 133–144)
SPECIMEN SOURCE: NORMAL
SPECIMEN SOURCE: NORMAL
WBC # BLD AUTO: 18 10E9/L (ref 4–11)

## 2018-04-27 PROCEDURE — 25000132 ZZH RX MED GY IP 250 OP 250 PS 637: Mod: GY | Performed by: INTERNAL MEDICINE

## 2018-04-27 PROCEDURE — 12000000 ZZH R&B MED SURG/OB

## 2018-04-27 PROCEDURE — 94640 AIRWAY INHALATION TREATMENT: CPT

## 2018-04-27 PROCEDURE — 80048 BASIC METABOLIC PNL TOTAL CA: CPT | Performed by: INTERNAL MEDICINE

## 2018-04-27 PROCEDURE — A9270 NON-COVERED ITEM OR SERVICE: HCPCS | Mod: GY | Performed by: INTERNAL MEDICINE

## 2018-04-27 PROCEDURE — 94640 AIRWAY INHALATION TREATMENT: CPT | Mod: 76

## 2018-04-27 PROCEDURE — 25000128 H RX IP 250 OP 636: Performed by: INTERNAL MEDICINE

## 2018-04-27 PROCEDURE — 99233 SBSQ HOSP IP/OBS HIGH 50: CPT | Performed by: INTERNAL MEDICINE

## 2018-04-27 PROCEDURE — 85027 COMPLETE CBC AUTOMATED: CPT | Performed by: INTERNAL MEDICINE

## 2018-04-27 PROCEDURE — 25000125 ZZHC RX 250: Performed by: INTERNAL MEDICINE

## 2018-04-27 PROCEDURE — 87070 CULTURE OTHR SPECIMN AEROBIC: CPT | Performed by: INTERNAL MEDICINE

## 2018-04-27 PROCEDURE — 36415 COLL VENOUS BLD VENIPUNCTURE: CPT | Performed by: INTERNAL MEDICINE

## 2018-04-27 PROCEDURE — 40000275 ZZH STATISTIC RCP TIME EA 10 MIN

## 2018-04-27 PROCEDURE — 87205 SMEAR GRAM STAIN: CPT | Performed by: INTERNAL MEDICINE

## 2018-04-27 PROCEDURE — 25000131 ZZH RX MED GY IP 250 OP 636 PS 637: Mod: GY | Performed by: INTERNAL MEDICINE

## 2018-04-27 RX ORDER — OXYCODONE AND ACETAMINOPHEN 10; 325 MG/1; MG/1
1 TABLET ORAL EVERY 8 HOURS PRN
Status: DISCONTINUED | OUTPATIENT
Start: 2018-04-27 | End: 2018-04-30 | Stop reason: HOSPADM

## 2018-04-27 RX ORDER — FLUTICASONE PROPIONATE 50 MCG
2 SPRAY, SUSPENSION (ML) NASAL 2 TIMES DAILY PRN
Status: DISCONTINUED | OUTPATIENT
Start: 2018-04-27 | End: 2018-04-30 | Stop reason: HOSPADM

## 2018-04-27 RX ORDER — QUETIAPINE FUMARATE 200 MG/1
400 TABLET, FILM COATED ORAL AT BEDTIME
Status: DISCONTINUED | OUTPATIENT
Start: 2018-04-27 | End: 2018-04-30 | Stop reason: HOSPADM

## 2018-04-27 RX ORDER — CYCLOBENZAPRINE HCL 10 MG
10 TABLET ORAL AT BEDTIME
Status: DISCONTINUED | OUTPATIENT
Start: 2018-04-27 | End: 2018-04-30 | Stop reason: HOSPADM

## 2018-04-27 RX ORDER — ALPRAZOLAM 0.5 MG
0.5 TABLET ORAL 4 TIMES DAILY PRN
Status: DISCONTINUED | OUTPATIENT
Start: 2018-04-27 | End: 2018-04-30 | Stop reason: HOSPADM

## 2018-04-27 RX ADMIN — IPRATROPIUM BROMIDE AND ALBUTEROL SULFATE 3 ML: .5; 3 SOLUTION RESPIRATORY (INHALATION) at 07:28

## 2018-04-27 RX ADMIN — PREDNISONE 60 MG: 10 TABLET ORAL at 08:46

## 2018-04-27 RX ADMIN — IPRATROPIUM BROMIDE AND ALBUTEROL SULFATE 3 ML: .5; 3 SOLUTION RESPIRATORY (INHALATION) at 19:15

## 2018-04-27 RX ADMIN — ALBUTEROL SULFATE 2.5 MG: 2.5 SOLUTION RESPIRATORY (INHALATION) at 23:09

## 2018-04-27 RX ADMIN — CEFEPIME HYDROCHLORIDE 2 G: 2 INJECTION, POWDER, FOR SOLUTION INTRAVENOUS at 22:15

## 2018-04-27 RX ADMIN — IPRATROPIUM BROMIDE AND ALBUTEROL SULFATE 3 ML: .5; 3 SOLUTION RESPIRATORY (INHALATION) at 11:54

## 2018-04-27 RX ADMIN — ENOXAPARIN SODIUM 40 MG: 40 INJECTION SUBCUTANEOUS at 22:18

## 2018-04-27 RX ADMIN — OXYCODONE AND ACETAMINOPHEN 1 TABLET: 10; 325 TABLET ORAL at 22:15

## 2018-04-27 RX ADMIN — SODIUM CHLORIDE: 9 INJECTION, SOLUTION INTRAVENOUS at 16:30

## 2018-04-27 RX ADMIN — ALBUTEROL SULFATE 2.5 MG: 2.5 SOLUTION RESPIRATORY (INHALATION) at 05:35

## 2018-04-27 RX ADMIN — CYCLOBENZAPRINE HYDROCHLORIDE 10 MG: 10 TABLET, FILM COATED ORAL at 22:15

## 2018-04-27 RX ADMIN — AZITHROMYCIN 250 MG: 250 TABLET, FILM COATED ORAL at 16:31

## 2018-04-27 RX ADMIN — CEFEPIME HYDROCHLORIDE 2 G: 2 INJECTION, POWDER, FOR SOLUTION INTRAVENOUS at 05:10

## 2018-04-27 RX ADMIN — QUETIAPINE FUMARATE 400 MG: 200 TABLET ORAL at 22:15

## 2018-04-27 RX ADMIN — CEFEPIME HYDROCHLORIDE 2 G: 2 INJECTION, POWDER, FOR SOLUTION INTRAVENOUS at 13:03

## 2018-04-27 RX ADMIN — OXYCODONE HYDROCHLORIDE 10 MG: 5 TABLET ORAL at 08:56

## 2018-04-27 RX ADMIN — SODIUM CHLORIDE: 9 INJECTION, SOLUTION INTRAVENOUS at 02:31

## 2018-04-27 RX ADMIN — GUAIFENESIN 10 ML: 100 SOLUTION ORAL at 05:34

## 2018-04-27 RX ADMIN — ALPRAZOLAM 0.5 MG: 0.5 TABLET ORAL at 13:49

## 2018-04-27 RX ADMIN — OXYCODONE AND ACETAMINOPHEN 1 TABLET: 10; 325 TABLET ORAL at 13:50

## 2018-04-27 RX ADMIN — IPRATROPIUM BROMIDE AND ALBUTEROL SULFATE 3 ML: .5; 3 SOLUTION RESPIRATORY (INHALATION) at 15:21

## 2018-04-27 RX ADMIN — OMEPRAZOLE 20 MG: 20 CAPSULE, DELAYED RELEASE ORAL at 08:47

## 2018-04-27 ASSESSMENT — ACTIVITIES OF DAILY LIVING (ADL)
ADLS_ACUITY_SCORE: 11
ADLS_ACUITY_SCORE: 15
ADLS_ACUITY_SCORE: 11
ADLS_ACUITY_SCORE: 13

## 2018-04-27 NOTE — PLAN OF CARE
Problem: Patient Care Overview  Goal: Plan of Care/Patient Progress Review  Outcome: No Change  Pt up SBA. Walked in halls x1. Alert and oriented x4. LS insp coarse, wheeze. C/o shortness of breath. Sputum is thick, pale yellow. Sputum sample pending. Currently on 3L of O2, tried to wean, unsuccessful. Pt c/o chronic neck pain, oxy given x1, percocet given x1 for relief.  Flu swab came back neg. WBC 18, platelet 594. Receiving cefepine and azithromycin. D/C TBD.

## 2018-04-27 NOTE — CONSULTS
"Care Transition Initial Assessment - RN    Reason For Consult: care coordination/care conference, discharge planning   Met with: Patient.  DATA   Active Problems:    Pneumonia       Cognitive Status: awake, alert and oriented.  Primary [facility]  Primary Care MD Name: Yvette Bell  Contact information and PCP information verified: Yes  Lives With: significant other  Living Arrangements: apartment     Description of Support System: Supportive, Involved   Who is your support system?: Significant Other   Support Assessment: Adequate family and caregiver support   Insurance concerns: No Insurance issues identified  ASSESSMENT  Patient currently receives the following services:  none        Identified issues/concerns regarding health management: pt admitted with pna/copd exacerbation. Pt states she went to her PCP, although her PCP was out of office and saw the on call MD about 2 weeks ago. Pt states she was sent home with a prn inhaler and some gel caps she thought was an antibiotic. She noticed she was not getting any better earlier this week but waited until Thursday, because she had a medication follow up appointment with her PCP, to go back to the doctors office. She was sent to the ER from her clinic. She states she was upset and the on call MD for not taking any xrays and found out the \"gel caps\" were actually a couch suppressent. Pt states she hasn't smoked in about a month and plans to quit permanently. Pna and COPD action plans were reviewed and given to the patient.     PLAN  Patient anticipates discharging to home .        Patient anticipates needs for home equipment: No  Plan/Disposition: Home   Appointments: PCP hospital follow up appointment scheduled Wednesday 5/9/18 @ 1340 [facility].      Care  (CTS) will continue to follow as needed.    Natalie Linda RN, BSN CTS  Care Coordinator  [phone]                "

## 2018-04-27 NOTE — PROGRESS NOTES
"[facility] RCAT     Date:4/26/2018  Admission Dx:PNA  Pulmonary History:COPD  Home Nebulizer/MDI Use:Alb mdi prn,   Home Oxygen:No  Acuity Level (RCAT flow sheet):3  Aerosol Therapy initiated:Douneb QID, Alb Q2 prn      Pulmonary Hygiene initiated:Cough and deep breathing techniques      Volume Expansion initiated:IS      Current Oxygen Requirements:4L  Current SpO2:93%    Re-evaluation date:4/29/2018    Patient Education:Education performed with patient in regards to indications/benefits of bronchodilators. Will continue to educate with patient as needed.         See \"RT Assessments\" flow sheet for patient assessment scoring and Acuity Level Details.             "

## 2018-04-27 NOTE — PROGRESS NOTES
[facility]    Hospitalist Progress Note  Name: Nayeli Blas    MRN: [id number]  Provider: Anisa Feliz MD  Date of Service: 04/27/2018    Assessment & Plan   Summary of Stay: Nayeli Blas is a 52 year old female who was admitted on 4/26/2018 for possible pneumonia.  Patient's past medical history significant for anxiety, depression, GERD and COPD presented with shortness of breath elevated leukocytosis requiring supplemental oxygen.    Shortness of breath likely from pneumonia.  --Chest x-ray consider  for viral pneumonia with some lingular infiltrate  --Elevated white cell count cannot exclude bacterial pneumonia  --Started on IV cefepime and oral azithromycin on admission will continue for now  --Pro calcitonin is 0.11.  If blood cultures remain negative we will discontinue IV antibiotic  --Influenza A & B negative.  RSV negative.  Cannot exclude parainfluenza viral  --Still requiring supplemental O2  --Is afebrile    Sepsis on admission  --Patient was tachypneic with elevated leukocytosis and initially elevated lactic acidosis  --Received fluids and IV antibiotics.   --Sepsis resolved    COPD exacerbation  --Likely secondary to viral pneumonia  --Started on prednisone 60 mg daily  --Duo nebs and as needed nebs  --On  azithromycin    Leukocytosis  --Likely from infection and use of steroids    GERD  --On omeprazole    Depression  --Citalopram held while on a azithromycin    History of tobacco use  --Counseled for smoking.  Declined nicotine replacement therapy        DVT Prophylaxis: Enoxaparin (Lovenox) SQ  Code Status: Full Code    Disposition: Expected discharge in 2-3 days to home once off supplemental oxygen      Interval History   Continues to have shortness of breath cough no fever no chills no chest pain no palpitations review of all of the systems negative    -Data reviewed today: I reviewed all new labs and imaging reports over the last 24 hours. I personally reviewed no images  or EKG's today.    Physical Exam   Temp: 97.4  F (36.3  C) Temp src: Oral BP: 138/54   Heart Rate: 92 Resp: 24 SpO2: 95 % O2 Device: Oxymask Oxygen Delivery: 3 LPM  Vitals:    04/26/18 1715 04/26/18 1945 04/27/18 0513   Weight: 75.8 kg (167 lb) 75.8 kg (167 lb) 76.5 kg (168 lb 9.6 oz)     Vital Signs with Ranges  Temp:  [96.9  F (36.1  C)-98.4  F (36.9  C)] 97.4  F (36.3  C)  Heart Rate:  [87-93] 92  Resp:  [18-28] 24  BP: ()/(43-90) 138/54  SpO2:  [93 %-100 %] 95 %  I/O last 3 completed shifts:  In: 2097 [I.V.:2097]  Out: 750 [Urine:750]      GEN:  Alert, oriented x 3, appears comfortable, NAD.  HEENT:  Normocephalic/atraumatic, no scleral icterus, no nasal discharge, mouth moist.  CV:  Regular rate and rhythm, no murmur or JVD.  S1 + S2 noted, no S3 or S4.  LUNGS: Few basilar crackles with scattered wheezing. symmetric chest rise on inhalation noted.  ABD:  Active bowel sounds, soft, non-tender/non-distended.  No rebound/guarding/rigidity.  EXT:  No edema.  No cyanosis.  No joint synovitis noted.  SKIN:  Dry to touch, no exanthems noted in the visualized areas.    Medications     sodium chloride 75 mL/hr at 04/27/18 0231       azithromycin  250 mg Oral Daily     ceFEPIme (MAXIPIME) IV  2 g Intravenous Q8H     enoxaparin  40 mg Subcutaneous Q24H     ipratropium - albuterol 0.5 mg/2.5 mg/3 mL  3 mL Nebulization 4x Daily     omeprazole (priLOSEC) CR capsule 20 mg  20 mg Oral QAM     predniSONE  60 mg Oral Daily     Data       Recent Labs  Lab 04/27/18  0627 04/26/18  1612   WBC 18.0* 19.8*   HGB 12.6 13.7   HCT 37.9 40.6   MCV 96 93   * 680*       Recent Labs  Lab 04/27/18  0627 04/26/18  1612    137   POTASSIUM 4.3 3.7   CHLORIDE 105 96   CO2 26 31   ANIONGAP 8 10   * 87   BUN 12 12   CR 0.54 0.68   GFRESTIMATED >90 89   GFRESTBLACK >90 >90   MACK 8.0* 8.7       Recent Labs  Lab 04/26/18  1809 04/26/18  1615   CULT No growth after 9 hours No growth after 9 hours     No results for input(s):  AST, ALT, GGT, ALKPHOS, BILITOTAL, BILICONJ, BILIDIRECT, RYAN in the last 168 hours.    Invalid input(s): BILIRUBININDIRECT  No results for input(s): INR in the last 168 hours.    Recent Labs  Lab 04/26/18  2318 04/26/18  1612   LACT 0.4* 2.5*     No results for input(s): LIPASE in the last 168 hours.  No results for input(s): CHOL, HDL, LDL, TRIG, CHOLHDLRATIO in the last 168 hours.  No results for input(s): TSH in the last 168 hours.    Recent Labs  Lab 04/26/18  1612   TROPI <0.015     No results for input(s): COLOR, APPEARANCE, URINEGLC, URINEBILI, URINEKETONE, SG, UBLD, URINEPH, PROTEIN, UROBILINOGEN, NITRITE, LEUKEST, RBCU, WBCU in the last 168 hours.    Recent Results (from the past 24 hour(s))   XR Chest 2 Views    Narrative    XR CHEST 2 VW 4/26/2018 6:55 PM    HISTORY: Cough, bilateral pneumonia.    COMPARISON: None.    FINDINGS: Diffuse prominence of the background interstitium with more  discrete consolidation in the lingula. No pleural effusion. No  pneumothorax. Normal heart size.      Impression    IMPRESSION: Appearance suggests viral pneumonia.    STARLA HAWLEY MD

## 2018-04-27 NOTE — H&P
[facility]  Hospitalist H&P    Name: Nayeli Blas      MRN: [id number]  YOB: 1965    Age: 52 year old  Date of admission: 4/26/2018  Primary care provider: Yvette Bell            Assessment and Plan:   Nayeli Blas is a 52 year old female with a history of anxiety, depression, GERD, and COPD who presents with pneumonia.    1.  Pneumonia.  Check pro-calcitonin.  Check influenza PCR.  For now, treat with IV cefepime to cover possible Pseudomonas and azithromycin to complete coverage for possible atypical pneumonias.    2.  Sepsis.  As evidenced by tachypnea, leukocytosis, and elevated lactic acid.  Due to pneumonia.  Give additional 1.5 L fluid bolus now to bring total fluid bolus up to 30 cc/kg.  Treat with IV cefepime and oral azithromycin.    3.  COPD exacerbation.  Due to pneumonia.  Start prednisone 60 mg a day.  Duo nebs 4 times a day.  Albuterol as needed.  Antibiotics as noted above.    4.  GERD.  Restart omeprazole.    5.  Depression.  Hold citalopram while on azithromycin.    6.  Tobacco use disorder.  I did  continued smoking cessation.  She does not feel the need for nicotine replacement therapy at this time.    Code status: Full code.  Admit to inpatient.  Prophylaxis: Lovenox.              Chief Complaint:   Shortness of breath.         History of Present Illness:   Nayeli Blas is a 52 year old female who presents with shortness of breath.  Has been feeling short of breath for the past 10 days.  Has had a cough with this.  Cough is productive of green colored sputum.  Is also felt fevers and chills.  Feels weak compared to usual.  Has had a decreased appetite.  Has had nausea.  Occasional vomiting.  Has had generalized body aches.  Has had multiple friends that are also sick with cold-like symptoms.  She was seen by primary care physician approximately 1 week ago.  Started on medications at that time.  She does not feel that she has improved any  since these medications were started.  She is not sure exactly what these medications were.  No other complaints.  Has had occasional upper respiratory tract infections in the past.  Does not remember feeling this sick previously.            Past Medical History:   No past medical history on file.          Past Surgical History:     Past Surgical History:   Procedure Laterality Date     APPENDECTOMY  1998     CHOLECYSTECTOMY               Social History:     Social History   Substance Use Topics     Smoking status: Not on file     Smokeless tobacco: Not on file     Alcohol use Not on file             Family History:   The family history was fully reviewed and non-contributory in this case.         Allergies:     Allergies   Allergen Reactions     Sulfa Drugs Anaphylaxis     Penicillins Rash             Medications:     Prior to Admission medications    Medication Sig Last Dose Taking? Auth Provider   albuterol (PROAIR HFA/PROVENTIL HFA/VENTOLIN HFA) 108 (90 Base) MCG/ACT Inhaler Inhale 1-2 puffs into the lungs every 4 hours as needed for shortness of breath / dyspnea or wheezing 4/26/2018 at am Yes Unknown, Entered By History   ALPRAZolam (XANAX PO) Take 0.5 mg by mouth 4 times daily as needed for anxiety 4/26/2018 at 1700 Yes Unknown, Entered By History   Citalopram Hydrobromide (CELEXA PO) Take 30 mg by mouth daily 4/25/2018 at Unknown time Yes Unknown, Entered By History   Cyclobenzaprine HCl (FLEXERIL PO) Take 20 mg by mouth At Bedtime 4/25/2018 at Unknown time Yes Unknown, Entered By History   fluticasone (FLONASE) 50 MCG/ACT spray Spray 2 sprays into both nostrils 2 times daily as needed for rhinitis or allergies 4/26/2018 at am Yes Unknown, Entered By History   naloxone (NARCAN) nasal spray Spray 4 mg into one nostril alternating nostrils as needed for opioid reversal every 2-3 minutes until assistance arrives  Yes Unknown, Entered By History   Omeprazole (PRILOSEC PO) Take 20 mg by mouth every morning  "4/26/2018 at am Yes Unknown, Entered By History   oxyCODONE-acetaminophen (PERCOCET)  MG per tablet Take 1 tablet by mouth every 8 hours as needed for severe pain 4/26/2018 at 1700 Yes Unknown, Entered By History   QUEtiapine Fumarate (SEROQUEL PO) Take 400 mg by mouth At Bedtime 4/25/2018 at Unknown time Yes Unknown, Entered By History             Review of Systems:   A Comprehensive greater than 10 system review of systems was carried out.  Pertinent positives and negatives are noted above.  Otherwise negative for contributory information.           Physical Exam:   Blood pressure 130/57, temperature 97.8  F (36.6  C), temperature source Oral, resp. rate 18, height 1.626 m (5' 4\"), weight 75.8 kg (167 lb), SpO2 94 %, not currently breastfeeding.  Wt Readings from Last 1 Encounters:   04/26/18 75.8 kg (167 lb)     Exam:  GENERAL: No apparent distress. Awake, alert, and fully oriented.  HEENT: Normocephalic, atraumatic. Extraocular movements intact.  CARDIOVASCULAR: Regular rate and rhythm without murmurs or rubs. No S3.  PULMONARY: Wheeze to auscultation bilaterally.  ABDOMINAL: Soft, non-tender, non-distended. Bowel sounds normoactive.   EXTREMITIES: No cyanosis or clubbing. No appreciable edema.  NEUROLOGICAL: CN 2-12 grossly intact, no focal neurological deficits.  DERMATOLOGICAL: No rash, ulcer, bruising, nor jaundice.          Data:       Laboratory:    Recent Labs  Lab 04/26/18  1612   WBC 19.8*   HGB 13.7   HCT 40.6   MCV 93   *       Recent Labs  Lab 04/26/18  1612      POTASSIUM 3.7   CHLORIDE 96   CO2 31   ANIONGAP 10   GLC 87   BUN 12   CR 0.68   GFRESTIMATED 89   GFRESTBLACK >90   MACK 8.7       Recent Labs  Lab 04/26/18  1809   CULT PENDING       Imaging:  Recent Results (from the past 24 hour(s))   XR Chest 2 Views    Narrative    XR CHEST 2 VW 4/26/2018 6:55 PM    HISTORY: Cough, bilateral pneumonia.    COMPARISON: None.    FINDINGS: Diffuse prominence of the background interstitium " with more  discrete consolidation in the lingula. No pleural effusion. No  pneumothorax. Normal heart size.      Impression    IMPRESSION: Appearance suggests viral pneumonia.    STARLA HAWLEY MD

## 2018-04-27 NOTE — PLAN OF CARE
Problem: Patient Care Overview  Goal: Plan of Care/Patient Progress Review  Outcome: No Change  Pt hospitalized with PNA.  Vital signs stable, denies any pain.   Currently on 3L with oxymask.   LS exp wheezing, coarse.   Up with SBA, voiding without difficulty.   ALEGRIA, SOB reported after ambulating to/from bathroom.   PRN neb given.   Continues to have non-productive cough, Robitussin x 1 given.   IVF NS 75/hr, continues IV Maxipime.

## 2018-04-27 NOTE — PHARMACY-ADMISSION MEDICATION HISTORY
Admission medication history interview status for this patient is complete. See [facility] admission navigator for allergy information, prior to admission medications and immunization status.     Medication history interview source(s):Patient  Medication history resources (including written lists, pill bottles, clinic record):clinic records    Changes made to PTA medication list:  Added: all  Deleted: none  Changed: none    Actions taken by pharmacist (provider contacted, etc):None     Additional medication history information:None    Medication reconciliation/reorder completed by provider prior to medication history? No    For patients on insulin therapy: no (Yes/No)   Lantus/levemir/NPH/Mix 70/30 dose: ___ in AM/PM or twice daily   Sliding scale Novolog Y/N   If Yes, do you have a baseline novolog pre-meal dose: ______units with meals   Patients eat three meals a day: Y/N ---  How many episodes of hypoglycemia (low blood glucose) do you have weekly: ---   How many missed doses do you have a week: ---  How many times do you check your blood glucose per day: ---  Any Barriers to therapy: cost of medications/comfortable with giving injections (if applicable)/ comfortable and confident with current diabetes regimen ---      Prior to Admission medications    Medication Sig Last Dose Taking? Auth Provider   albuterol (PROAIR HFA/PROVENTIL HFA/VENTOLIN HFA) 108 (90 Base) MCG/ACT Inhaler Inhale 1-2 puffs into the lungs every 4 hours as needed for shortness of breath / dyspnea or wheezing 4/26/2018 at am Yes Unknown, Entered By History   ALPRAZolam (XANAX PO) Take 0.5 mg by mouth 4 times daily as needed for anxiety 4/26/2018 at 1700 Yes Unknown, Entered By History   Citalopram Hydrobromide (CELEXA PO) Take 30 mg by mouth daily 4/25/2018 at Unknown time Yes Unknown, Entered By History   Cyclobenzaprine HCl (FLEXERIL PO) Take 20 mg by mouth At Bedtime 4/25/2018 at Unknown time Yes Unknown, Entered By History   fluticasone  (FLONASE) 50 MCG/ACT spray Spray 2 sprays into both nostrils 2 times daily as needed for rhinitis or allergies 4/26/2018 at am Yes Unknown, Entered By History   naloxone (NARCAN) nasal spray Spray 4 mg into one nostril alternating nostrils as needed for opioid reversal every 2-3 minutes until assistance arrives  Yes Unknown, Entered By History   Omeprazole (PRILOSEC PO) Take 20 mg by mouth every morning 4/26/2018 at am Yes Unknown, Entered By History   oxyCODONE-acetaminophen (PERCOCET)  MG per tablet Take 1 tablet by mouth every 8 hours as needed for severe pain 4/26/2018 at 1700 Yes Unknown, Entered By History   QUEtiapine Fumarate (SEROQUEL PO) Take 400 mg by mouth At Bedtime 4/25/2018 at Unknown time Yes Unknown, Entered By History

## 2018-04-27 NOTE — PLAN OF CARE
Problem: Patient Care Overview  Goal: Plan of Care/Patient Progress Review  Patient admitted to the floor w/ PNA. Currently on 5 lpm oxymask w/ 02 sats low to mid 90s's. Otherwise vss. Denied pain. Up w/ SBA. 2nd iv placed. 1500 ml bolus over 2 hours. IV Cefepime administered. Placed on droplet, flu pending. ALEGRIA, denied SOB at rest, frequent cough, awaiting sputum for sample. LS coarse w/ exp wheezing. Pt A&O. Home medications sent to pharmacy. During admission charge nurse told patient that medications should be sent home w/ significant other. Later in the evening patient asked writer about when she could take her home meds. While writer was talking to pharmacy and md, patient decided to take own flexeril,celexa,and Seroquel. Md aware of patient taking own meds.

## 2018-04-28 LAB
ANION GAP SERPL CALCULATED.3IONS-SCNC: 8 MMOL/L (ref 3–14)
BASOPHILS # BLD AUTO: 0.1 10E9/L (ref 0–0.2)
BASOPHILS NFR BLD AUTO: 0.3 %
BUN SERPL-MCNC: 12 MG/DL (ref 7–30)
CALCIUM SERPL-MCNC: 8.3 MG/DL (ref 8.5–10.1)
CHLORIDE SERPL-SCNC: 106 MMOL/L (ref 94–109)
CO2 SERPL-SCNC: 26 MMOL/L (ref 20–32)
CREAT SERPL-MCNC: 0.58 MG/DL (ref 0.52–1.04)
DIFFERENTIAL METHOD BLD: ABNORMAL
EOSINOPHIL # BLD AUTO: 0 10E9/L (ref 0–0.7)
EOSINOPHIL NFR BLD AUTO: 0.1 %
ERYTHROCYTE [DISTWIDTH] IN BLOOD BY AUTOMATED COUNT: 14.6 % (ref 10–15)
GFR SERPL CREATININE-BSD FRML MDRD: >90 ML/MIN/1.7M2
GLUCOSE SERPL-MCNC: 80 MG/DL (ref 70–99)
HCT VFR BLD AUTO: 35.3 % (ref 35–47)
HGB BLD-MCNC: 11.6 G/DL (ref 11.7–15.7)
IMM GRANULOCYTES # BLD: 0.6 10E9/L (ref 0–0.4)
IMM GRANULOCYTES NFR BLD: 3.2 %
LYMPHOCYTES # BLD AUTO: 3.4 10E9/L (ref 0.8–5.3)
LYMPHOCYTES NFR BLD AUTO: 17.3 %
MCH RBC QN AUTO: 31.8 PG (ref 26.5–33)
MCHC RBC AUTO-ENTMCNC: 32.9 G/DL (ref 31.5–36.5)
MCV RBC AUTO: 97 FL (ref 78–100)
MONOCYTES # BLD AUTO: 0.9 10E9/L (ref 0–1.3)
MONOCYTES NFR BLD AUTO: 4.5 %
NEUTROPHILS # BLD AUTO: 14.5 10E9/L (ref 1.6–8.3)
NEUTROPHILS NFR BLD AUTO: 74.6 %
NRBC # BLD AUTO: 0 10*3/UL
NRBC BLD AUTO-RTO: 0 /100
PLATELET # BLD AUTO: 587 10E9/L (ref 150–450)
POTASSIUM SERPL-SCNC: 3.6 MMOL/L (ref 3.4–5.3)
RBC # BLD AUTO: 3.65 10E12/L (ref 3.8–5.2)
SODIUM SERPL-SCNC: 140 MMOL/L (ref 133–144)
WBC # BLD AUTO: 19.5 10E9/L (ref 4–11)

## 2018-04-28 PROCEDURE — 85025 COMPLETE CBC W/AUTO DIFF WBC: CPT | Performed by: INTERNAL MEDICINE

## 2018-04-28 PROCEDURE — 25000131 ZZH RX MED GY IP 250 OP 636 PS 637: Mod: GY | Performed by: INTERNAL MEDICINE

## 2018-04-28 PROCEDURE — 40000275 ZZH STATISTIC RCP TIME EA 10 MIN

## 2018-04-28 PROCEDURE — 36415 COLL VENOUS BLD VENIPUNCTURE: CPT | Performed by: INTERNAL MEDICINE

## 2018-04-28 PROCEDURE — 80048 BASIC METABOLIC PNL TOTAL CA: CPT | Performed by: INTERNAL MEDICINE

## 2018-04-28 PROCEDURE — A9270 NON-COVERED ITEM OR SERVICE: HCPCS | Mod: GY | Performed by: INTERNAL MEDICINE

## 2018-04-28 PROCEDURE — 12000000 ZZH R&B MED SURG/OB

## 2018-04-28 PROCEDURE — 25000132 ZZH RX MED GY IP 250 OP 250 PS 637: Mod: GY | Performed by: INTERNAL MEDICINE

## 2018-04-28 PROCEDURE — 94640 AIRWAY INHALATION TREATMENT: CPT

## 2018-04-28 PROCEDURE — 94640 AIRWAY INHALATION TREATMENT: CPT | Mod: 76

## 2018-04-28 PROCEDURE — 99233 SBSQ HOSP IP/OBS HIGH 50: CPT | Performed by: INTERNAL MEDICINE

## 2018-04-28 PROCEDURE — 25000125 ZZHC RX 250: Performed by: INTERNAL MEDICINE

## 2018-04-28 PROCEDURE — 25000128 H RX IP 250 OP 636: Performed by: INTERNAL MEDICINE

## 2018-04-28 RX ORDER — PREDNISONE 20 MG/1
40 TABLET ORAL DAILY
Status: DISCONTINUED | OUTPATIENT
Start: 2018-04-29 | End: 2018-04-30 | Stop reason: HOSPADM

## 2018-04-28 RX ADMIN — GUAIFENESIN 10 ML: 100 SOLUTION ORAL at 16:01

## 2018-04-28 RX ADMIN — GUAIFENESIN 10 ML: 100 SOLUTION ORAL at 21:44

## 2018-04-28 RX ADMIN — IPRATROPIUM BROMIDE AND ALBUTEROL SULFATE 3 ML: .5; 3 SOLUTION RESPIRATORY (INHALATION) at 19:26

## 2018-04-28 RX ADMIN — ALPRAZOLAM 0.5 MG: 0.5 TABLET ORAL at 16:01

## 2018-04-28 RX ADMIN — IPRATROPIUM BROMIDE AND ALBUTEROL SULFATE 3 ML: .5; 3 SOLUTION RESPIRATORY (INHALATION) at 07:25

## 2018-04-28 RX ADMIN — OXYCODONE AND ACETAMINOPHEN 1 TABLET: 10; 325 TABLET ORAL at 16:01

## 2018-04-28 RX ADMIN — QUETIAPINE FUMARATE 400 MG: 200 TABLET ORAL at 21:44

## 2018-04-28 RX ADMIN — ENOXAPARIN SODIUM 40 MG: 40 INJECTION SUBCUTANEOUS at 21:44

## 2018-04-28 RX ADMIN — AZITHROMYCIN 250 MG: 250 TABLET, FILM COATED ORAL at 16:01

## 2018-04-28 RX ADMIN — CYCLOBENZAPRINE HYDROCHLORIDE 10 MG: 10 TABLET, FILM COATED ORAL at 21:44

## 2018-04-28 RX ADMIN — CEFEPIME HYDROCHLORIDE 2 G: 2 INJECTION, POWDER, FOR SOLUTION INTRAVENOUS at 06:00

## 2018-04-28 RX ADMIN — CEFEPIME HYDROCHLORIDE 2 G: 2 INJECTION, POWDER, FOR SOLUTION INTRAVENOUS at 13:11

## 2018-04-28 RX ADMIN — IPRATROPIUM BROMIDE AND ALBUTEROL SULFATE 3 ML: .5; 3 SOLUTION RESPIRATORY (INHALATION) at 12:10

## 2018-04-28 RX ADMIN — GUAIFENESIN 10 ML: 100 SOLUTION ORAL at 11:13

## 2018-04-28 RX ADMIN — ALPRAZOLAM 0.5 MG: 0.5 TABLET ORAL at 21:44

## 2018-04-28 RX ADMIN — OXYCODONE AND ACETAMINOPHEN 1 TABLET: 10; 325 TABLET ORAL at 06:00

## 2018-04-28 RX ADMIN — SODIUM CHLORIDE: 9 INJECTION, SOLUTION INTRAVENOUS at 06:10

## 2018-04-28 RX ADMIN — ALPRAZOLAM 0.5 MG: 0.5 TABLET ORAL at 08:12

## 2018-04-28 RX ADMIN — ALBUTEROL SULFATE 2.5 MG: 2.5 SOLUTION RESPIRATORY (INHALATION) at 22:22

## 2018-04-28 RX ADMIN — PREDNISONE 60 MG: 10 TABLET ORAL at 08:12

## 2018-04-28 RX ADMIN — OMEPRAZOLE 20 MG: 20 CAPSULE, DELAYED RELEASE ORAL at 08:11

## 2018-04-28 RX ADMIN — IPRATROPIUM BROMIDE AND ALBUTEROL SULFATE 3 ML: .5; 3 SOLUTION RESPIRATORY (INHALATION) at 16:18

## 2018-04-28 RX ADMIN — SODIUM CHLORIDE: 9 INJECTION, SOLUTION INTRAVENOUS at 20:05

## 2018-04-28 ASSESSMENT — ACTIVITIES OF DAILY LIVING (ADL)
ADLS_ACUITY_SCORE: 11

## 2018-04-28 NOTE — PLAN OF CARE
Problem: Patient Care Overview  Goal: Plan of Care/Patient Progress Review  Outcome: Improving  Vitals stable. Receiving nebs, amb to bathroom with assist of one and oxygen at 3l nc. Family visiting and supportive. Continues on antibiotics, po and IV. Lungs coarse with wheezes.

## 2018-04-28 NOTE — PLAN OF CARE
Problem: Patient Care Overview  Goal: Plan of Care/Patient Progress Review  Outcome: Improving  Pt up SBA. Alert and oriented x4. LS insp wheeze. RLL crackles. Pt states SOB w/ activity. Frequent cough. On 2L O2 at 94%. Pt dropped to 87% on room air. Pt states her breathing is getting better. Encouraged pt to walk in halls, pt did not want to when offered. Xanax given x1 for anxiety. NS running at 75mL/hr. Receiving maxipime. D/C TBD.

## 2018-04-28 NOTE — PLAN OF CARE
Problem: Pneumonia (Adult)  Goal: Signs and Symptoms of Listed Potential Problems Will be Absent, Minimized or Managed (Pneumonia)  Signs and symptoms of listed potential problems will be absent, minimized or managed by discharge/transition of care (reference Pneumonia (Adult) CPG).   Outcome: Improving  Pt remains hospitalized for PNA.   Vital signs stable, except tachy, currently on 2L with mask.   Up with SBA, no alarms on, calling approp.  IVF 75/hr,continues IV Maxipime.  PRN Percocet x 1. Continues oral Zithromax and Prednisone.   Voiding adequately.   Sputum culture pending.   Will continue to monitor.

## 2018-04-28 NOTE — PLAN OF CARE
Problem: Pneumonia (Adult)  Goal: Signs and Symptoms of Listed Potential Problems Will be Absent, Minimized or Managed (Pneumonia)  Signs and symptoms of listed potential problems will be absent, minimized or managed by discharge/transition of care (reference Pneumonia (Adult) CPG).   Outcome: Improving  Pt remains hospitalized for PNA.   Vital signs stable except tachy, currently on 2L with mask.   Up with SBA, no alarms, calls approp.   Voiding adequately.   IVF 75/hr, continues IV Maxipime.   Percocet x 1.

## 2018-04-28 NOTE — PROGRESS NOTES
[facility]    Hospitalist Progress Note  Name: Nayeli Blas    MRN: [id number]  Provider: Anisa Feliz MD  Date of Service: 04/28/2018    Assessment & Plan   Summary of Stay: Nayeli Blas is a 52 year old female who was admitted on 4/26/2018 for possible pneumonia.  Patient's past medical history significant for anxiety, depression, GERD and COPD presented with shortness of breath elevated leukocytosis requiring supplemental oxygen.    Shortness of breath likely from pneumonia.  --Chest x-ray consider  for viral pneumonia with some lingular infiltrate  --Elevated white cell count cannot exclude bacterial pneumonia  --Started on IV cefepime and oral azithromycin on admission will continue for now  --Pro calcitonin is 0.11.   blood cultures remain negative we will discontinue IV antibiotic  --Influenza A & B negative.  RSV negative.  Cannot exclude parainfluenza viral  --Still requiring supplemental O2. Wean as able  --Is afebrile    Sepsis on admission  --Patient was tachypneic with elevated leukocytosis and initially elevated lactic acidosis  --Received fluids and IV antibiotics.   --Sepsis resolved    COPD exacerbation  --Likely secondary to viral pneumonia  --Started on prednisone 60 mg daily will taper to 40 mg  --Duo nebs and as needed nebs  --On  azithromycin    Leukocytosis  --Likely from infection and use of steroids  -- procalc negative    GERD  --On omeprazole    Depression  --Citalopram held while on a azithromycin    History of tobacco use  --Counseled for smoking.  Declined nicotine replacement therapy        DVT Prophylaxis: Enoxaparin (Lovenox) SQ  Code Status: Full Code    Disposition: Expected discharge in 2-3 days to home once off supplemental oxygen      Interval History   He needs to require supplemental oxygen.  Shortness of breath improved but still continues to have shortness of breath cough no fever no chills no chest pain no palpitations review of all of the systems  negative    -Data reviewed today: I reviewed all new labs and imaging reports over the last 24 hours. I personally reviewed no images or EKG's today.    Physical Exam   Temp: 97.1  F (36.2  C) Temp src: Axillary BP: 113/41   Heart Rate: 86 Resp: 20 SpO2: 98 % O2 Device: Oxymask Oxygen Delivery: 2 LPM  Vitals:    04/26/18 1945 04/27/18 0513 04/28/18 0622   Weight: 75.8 kg (167 lb) 76.5 kg (168 lb 9.6 oz) 77.7 kg (171 lb 4.8 oz)     Vital Signs with Ranges  Temp:  [96.7  F (35.9  C)-97.1  F (36.2  C)] 97.1  F (36.2  C)  Heart Rate:  [74-93] 86  Resp:  [20] 20  BP: (107-140)/(41-66) 113/41  SpO2:  [93 %-98 %] 98 %  I/O last 3 completed shifts:  In: 3717 [P.O.:1700; I.V.:2017]  Out: 1175 [Urine:1175]      GEN:  Alert, oriented x 3, appears comfortable, NAD.  HEENT:  Normocephalic/atraumatic, no scleral icterus, no nasal discharge, mouth moist.  CV:  Regular rate and rhythm, no murmur or JVD.  S1 + S2 noted, no S3 or S4.  LUNGS: Few basilar crackles with scattered wheezing. symmetric chest rise on inhalation noted.  ABD:  Active bowel sounds, soft, non-tender/non-distended.  No rebound/guarding/rigidity.  EXT:  No edema.  No cyanosis.  No joint synovitis noted.  SKIN:  Dry to touch, no exanthems noted in the visualized areas.    Medications     sodium chloride 75 mL/hr at 04/28/18 0818       azithromycin  250 mg Oral Daily     ceFEPIme (MAXIPIME) IV  2 g Intravenous Q8H     cyclobenzaprine (FLEXERIL) tablet 10 mg  10 mg Oral At Bedtime     enoxaparin  40 mg Subcutaneous Q24H     ipratropium - albuterol 0.5 mg/2.5 mg/3 mL  3 mL Nebulization 4x Daily     omeprazole (priLOSEC) CR capsule 20 mg  20 mg Oral QAM     predniSONE  60 mg Oral Daily     QUEtiapine (SEROquel) tablet 400 mg  400 mg Oral At Bedtime     Data       Recent Labs  Lab 04/28/18  0630 04/27/18  0627 04/26/18  1612   WBC 19.5* 18.0* 19.8*   HGB 11.6* 12.6 13.7   HCT 35.3 37.9 40.6   MCV 97 96 93   * 594* 680*       Recent Labs  Lab 04/28/18  0630  04/27/18  0627 04/26/18  1612    139 137   POTASSIUM 3.6 4.3 3.7   CHLORIDE 106 105 96   CO2 26 26 31   ANIONGAP 8 8 10   GLC 80 113* 87   BUN 12 12 12   CR 0.58 0.54 0.68   GFRESTIMATED >90 >90 89   GFRESTBLACK >90 >90 >90   MACK 8.3* 8.0* 8.7       Recent Labs  Lab 04/26/18  1809 04/26/18  1615   CULT No growth after 2 days No growth after 2 days     No results for input(s): AST, ALT, GGT, ALKPHOS, BILITOTAL, BILICONJ, BILIDIRECT, RYAN in the last 168 hours.    Invalid input(s): BILIRUBININDIRECT  No results for input(s): INR in the last 168 hours.    Recent Labs  Lab 04/26/18  2318 04/26/18  1612   LACT 0.4* 2.5*     No results for input(s): LIPASE in the last 168 hours.  No results for input(s): CHOL, HDL, LDL, TRIG, CHOLHDLRATIO in the last 168 hours.  No results for input(s): TSH in the last 168 hours.    Recent Labs  Lab 04/26/18  1612   TROPI <0.015     No results for input(s): COLOR, APPEARANCE, URINEGLC, URINEBILI, URINEKETONE, SG, UBLD, URINEPH, PROTEIN, UROBILINOGEN, NITRITE, LEUKEST, RBCU, WBCU in the last 168 hours.    No results found for this or any previous visit (from the past 24 hour(s)).

## 2018-04-29 LAB
ANION GAP SERPL CALCULATED.3IONS-SCNC: 7 MMOL/L (ref 3–14)
BACTERIA SPEC CULT: NORMAL
BASOPHILS # BLD AUTO: 0 10E9/L (ref 0–0.2)
BASOPHILS NFR BLD AUTO: 0.4 %
BUN SERPL-MCNC: 7 MG/DL (ref 7–30)
CALCIUM SERPL-MCNC: 8.1 MG/DL (ref 8.5–10.1)
CHLORIDE SERPL-SCNC: 109 MMOL/L (ref 94–109)
CO2 SERPL-SCNC: 28 MMOL/L (ref 20–32)
CREAT SERPL-MCNC: 0.64 MG/DL (ref 0.52–1.04)
DIFFERENTIAL METHOD BLD: ABNORMAL
EOSINOPHIL # BLD AUTO: 0 10E9/L (ref 0–0.7)
EOSINOPHIL NFR BLD AUTO: 0.3 %
ERYTHROCYTE [DISTWIDTH] IN BLOOD BY AUTOMATED COUNT: 15 % (ref 10–15)
GFR SERPL CREATININE-BSD FRML MDRD: >90 ML/MIN/1.7M2
GLUCOSE SERPL-MCNC: 77 MG/DL (ref 70–99)
HCT VFR BLD AUTO: 32.7 % (ref 35–47)
HGB BLD-MCNC: 10.8 G/DL (ref 11.7–15.7)
IMM GRANULOCYTES # BLD: 0.2 10E9/L (ref 0–0.4)
IMM GRANULOCYTES NFR BLD: 1.9 %
LYMPHOCYTES # BLD AUTO: 3.1 10E9/L (ref 0.8–5.3)
LYMPHOCYTES NFR BLD AUTO: 28.3 %
MCH RBC QN AUTO: 32.3 PG (ref 26.5–33)
MCHC RBC AUTO-ENTMCNC: 33 G/DL (ref 31.5–36.5)
MCV RBC AUTO: 98 FL (ref 78–100)
MONOCYTES # BLD AUTO: 0.8 10E9/L (ref 0–1.3)
MONOCYTES NFR BLD AUTO: 7.6 %
NEUTROPHILS # BLD AUTO: 6.8 10E9/L (ref 1.6–8.3)
NEUTROPHILS NFR BLD AUTO: 61.5 %
NRBC # BLD AUTO: 0 10*3/UL
NRBC BLD AUTO-RTO: 0 /100
PLATELET # BLD AUTO: 471 10E9/L (ref 150–450)
PLATELET # BLD EST: ABNORMAL 10*3/UL
POTASSIUM SERPL-SCNC: 3.7 MMOL/L (ref 3.4–5.3)
RBC # BLD AUTO: 3.34 10E12/L (ref 3.8–5.2)
RBC MORPH BLD: ABNORMAL
SODIUM SERPL-SCNC: 144 MMOL/L (ref 133–144)
SPECIMEN SOURCE: NORMAL
VARIANT LYMPHS BLD QL SMEAR: PRESENT
WBC # BLD AUTO: 11 10E9/L (ref 4–11)

## 2018-04-29 PROCEDURE — A9270 NON-COVERED ITEM OR SERVICE: HCPCS | Mod: GY | Performed by: INTERNAL MEDICINE

## 2018-04-29 PROCEDURE — 25000128 H RX IP 250 OP 636: Performed by: INTERNAL MEDICINE

## 2018-04-29 PROCEDURE — 82565 ASSAY OF CREATININE: CPT | Performed by: INTERNAL MEDICINE

## 2018-04-29 PROCEDURE — 80048 BASIC METABOLIC PNL TOTAL CA: CPT | Performed by: INTERNAL MEDICINE

## 2018-04-29 PROCEDURE — 25000132 ZZH RX MED GY IP 250 OP 250 PS 637: Mod: GY | Performed by: INTERNAL MEDICINE

## 2018-04-29 PROCEDURE — 40000275 ZZH STATISTIC RCP TIME EA 10 MIN

## 2018-04-29 PROCEDURE — 94640 AIRWAY INHALATION TREATMENT: CPT | Mod: 76

## 2018-04-29 PROCEDURE — 85025 COMPLETE CBC W/AUTO DIFF WBC: CPT | Performed by: INTERNAL MEDICINE

## 2018-04-29 PROCEDURE — 12000000 ZZH R&B MED SURG/OB

## 2018-04-29 PROCEDURE — 99233 SBSQ HOSP IP/OBS HIGH 50: CPT | Performed by: INTERNAL MEDICINE

## 2018-04-29 PROCEDURE — 94640 AIRWAY INHALATION TREATMENT: CPT

## 2018-04-29 PROCEDURE — 25000125 ZZHC RX 250: Performed by: INTERNAL MEDICINE

## 2018-04-29 PROCEDURE — 36415 COLL VENOUS BLD VENIPUNCTURE: CPT | Performed by: INTERNAL MEDICINE

## 2018-04-29 RX ADMIN — AZITHROMYCIN 250 MG: 250 TABLET, FILM COATED ORAL at 15:56

## 2018-04-29 RX ADMIN — OXYCODONE AND ACETAMINOPHEN 1 TABLET: 10; 325 TABLET ORAL at 08:07

## 2018-04-29 RX ADMIN — PREDNISONE 40 MG: 20 TABLET ORAL at 08:07

## 2018-04-29 RX ADMIN — IPRATROPIUM BROMIDE AND ALBUTEROL SULFATE 3 ML: .5; 3 SOLUTION RESPIRATORY (INHALATION) at 19:33

## 2018-04-29 RX ADMIN — ALPRAZOLAM 0.5 MG: 0.5 TABLET ORAL at 16:01

## 2018-04-29 RX ADMIN — OXYCODONE AND ACETAMINOPHEN 1 TABLET: 10; 325 TABLET ORAL at 16:00

## 2018-04-29 RX ADMIN — CYCLOBENZAPRINE HYDROCHLORIDE 10 MG: 10 TABLET, FILM COATED ORAL at 22:29

## 2018-04-29 RX ADMIN — ENOXAPARIN SODIUM 40 MG: 40 INJECTION SUBCUTANEOUS at 22:30

## 2018-04-29 RX ADMIN — OMEPRAZOLE 20 MG: 20 CAPSULE, DELAYED RELEASE ORAL at 08:07

## 2018-04-29 RX ADMIN — QUETIAPINE FUMARATE 400 MG: 200 TABLET ORAL at 22:29

## 2018-04-29 RX ADMIN — ALBUTEROL SULFATE 2.5 MG: 2.5 SOLUTION RESPIRATORY (INHALATION) at 23:01

## 2018-04-29 RX ADMIN — ALPRAZOLAM 0.5 MG: 0.5 TABLET ORAL at 08:07

## 2018-04-29 RX ADMIN — IPRATROPIUM BROMIDE AND ALBUTEROL SULFATE 3 ML: .5; 3 SOLUTION RESPIRATORY (INHALATION) at 11:55

## 2018-04-29 RX ADMIN — IPRATROPIUM BROMIDE AND ALBUTEROL SULFATE 3 ML: .5; 3 SOLUTION RESPIRATORY (INHALATION) at 07:43

## 2018-04-29 RX ADMIN — SODIUM CHLORIDE: 9 INJECTION, SOLUTION INTRAVENOUS at 09:26

## 2018-04-29 RX ADMIN — IPRATROPIUM BROMIDE AND ALBUTEROL SULFATE 3 ML: .5; 3 SOLUTION RESPIRATORY (INHALATION) at 15:36

## 2018-04-29 ASSESSMENT — ACTIVITIES OF DAILY LIVING (ADL)
ADLS_ACUITY_SCORE: 11

## 2018-04-29 NOTE — PLAN OF CARE
Problem: Pneumonia (Adult)  Goal: Signs and Symptoms of Listed Potential Problems Will be Absent, Minimized or Managed (Pneumonia)  Signs and symptoms of listed potential problems will be absent, minimized or managed by discharge/transition of care (reference Pneumonia (Adult) CPG).   Outcome: No Change  Pt remains afebrile, denies pain, reports SOB with exertion, continuous to require oxygen, maintain sats >95% on 2 lpm. Ambulated to bathroom without oxygen & de sats to 88% on RA. Pt gown & bedding changed, wet from sweating, states she is having hot flushes. /39, rechecked 113/46, IVF infusing @ 75 mls, adequate UOP.

## 2018-04-29 NOTE — PLAN OF CARE
Problem: Patient Care Overview  Goal: Plan of Care/Patient Progress Review  Outcome: Improving  On 2l oximask. Able to amb in halls, states feels much better today and able to tolerate activity and talking during the whole walk. Up to bathroom, states had bm. Family visiting and supportive. Hoping to dc home tomorrow as it will be her [birth year] tom[birth year]. Iv antibiotic dc'd and cont on po zithromax.

## 2018-04-29 NOTE — PROGRESS NOTES
[facility]    Hospitalist Progress Note  Name: Nayeli Blas    MRN: [id number]  Provider: Anisa Feliz MD  Date of Service: 04/29/2018    Assessment & Plan   Summary of Stay: Nayeli Blas is a 52 year old female who was admitted on 4/26/2018 for possible pneumonia.  Patient's past medical history significant for anxiety, depression, GERD and COPD presented with shortness of breath elevated leukocytosis requiring supplemental oxygen    Shortness of breath likely from pneumonia.  --Chest x-ray consider  for viral pneumonia with some lingular infiltrate  --Elevated white cell count cannot exclude bacterial pneumonia  --Started on IV cefepime and oral azithromycin on admission.  Will DC IV cefepime with negative pro calcitonin  --Pro calcitonin is 0.11.   blood cultures remain negative we will discontinue IV antibiotic  --Influenza A & B negative.  RSV negative.  Cannot exclude parainfluenza viral  --Still requiring supplemental O2. Wean as able  --Is afebrile    Sepsis on admission  --Patient was tachypneic with elevated leukocytosis and initially elevated lactic acidosis  --Received fluids and IV antibiotics.   --Sepsis resolved    COPD exacerbation  --Likely secondary to viral pneumonia  --Started on prednisone 60 mg daily will taper to 40 mg  --Duo nebs and as needed nebs  --On  azithromycin    Leukocytosis  --Likely from infection and use of steroids  -- procalc negative    GERD  --On omeprazole    Depression  --Citalopram held while on a azithromycin    History of tobacco use  --Counseled for smoking.  Declined nicotine replacement therapy        DVT Prophylaxis: Enoxaparin (Lovenox) SQ  Code Status: Full Code    Disposition: Expected discharge in 2-3 days to home once off supplemental oxygen      Interval History   Breathing has improved.  She still requires oxygen with minimal exertion down to about 1 L at rest.  It [birth year] patient's [birth year] today hoping to be discharged soon no fever no  chills no chest pain no palpitations review of all of the systems negative    -Data reviewed today: I reviewed all new labs and imaging reports over the last 24 hours. I personally reviewed no images or EKG's today.    Physical Exam   Temp: 96.5  F (35.8  C) Temp src: Oral BP: 135/52 Pulse: 87 Heart Rate: 85 Resp: 18 SpO2: 94 % O2 Device: Nasal cannula Oxygen Delivery: 2 LPM  Vitals:    04/26/18 1945 04/27/18 0513 04/28/18 0622   Weight: 75.8 kg (167 lb) 76.5 kg (168 lb 9.6 oz) 77.7 kg (171 lb 4.8 oz)     Vital Signs with Ranges  Temp:  [96.5  F (35.8  C)-98  F (36.7  C)] 96.5  F (35.8  C)  Pulse:  [87-95] 87  Heart Rate:  [82-93] 85  Resp:  [18-20] 18  BP: (113-135)/(39-64) 135/52  SpO2:  [88 %-96 %] 94 %  I/O last 3 completed shifts:  In: 2521 [P.O.:460; I.V.:2061]  Out: 1200 [Urine:1200]      GEN:  Alert, oriented x 3, appears comfortable, NAD.  HEENT:  Normocephalic/atraumatic, no scleral icterus, no nasal discharge, mouth moist.  CV:  Regular rate and rhythm, no murmur or JVD.  S1 + S2 noted, no S3 or S4.  LUNGS: Few basilar crackles with scattered wheezing. symmetric chest rise on inhalation noted.  ABD:  Active bowel sounds, soft, non-tender/non-distended.  No rebound/guarding/rigidity.  EXT:  No edema.  No cyanosis.  No joint synovitis noted.  SKIN:  Dry to touch, no exanthems noted in the visualized areas.    Medications     sodium chloride 75 mL/hr at 04/29/18 0810       azithromycin  250 mg Oral Daily     cyclobenzaprine (FLEXERIL) tablet 10 mg  10 mg Oral At Bedtime     enoxaparin  40 mg Subcutaneous Q24H     ipratropium - albuterol 0.5 mg/2.5 mg/3 mL  3 mL Nebulization 4x Daily     omeprazole (priLOSEC) CR capsule 20 mg  20 mg Oral QAM     predniSONE  40 mg Oral Daily     QUEtiapine (SEROquel) tablet 400 mg  400 mg Oral At Bedtime     Data       Recent Labs  Lab 04/29/18  0629 04/28/18  0630 04/27/18  0627   WBC 11.0 19.5* 18.0*   HGB 10.8* 11.6* 12.6   HCT 32.7* 35.3 37.9   MCV 98 97 96   * 587*  594*       Recent Labs  Lab 04/29/18  0629 04/28/18  0630 04/27/18  0627    140 139   POTASSIUM 3.7 3.6 4.3   CHLORIDE 109 106 105   CO2 28 26 26   ANIONGAP 7 8 8   GLC 77 80 113*   BUN 7 12 12   CR 0.64 0.58 0.54   GFRESTIMATED >90 >90 >90   GFRESTBLACK >90 >90 >90   MACK 8.1* 8.3* 8.0*       Recent Labs  Lab 04/27/18  1033 04/26/18  1809 04/26/18  1615   CULT Light growthNormal clarita to date No growth after 3 days No growth after 3 days     No results for input(s): AST, ALT, GGT, ALKPHOS, BILITOTAL, BILICONJ, BILIDIRECT, RYAN in the last 168 hours.    Invalid input(s): BILIRUBININDIRECT  No results for input(s): INR in the last 168 hours.    Recent Labs  Lab 04/26/18  2318 04/26/18  1612   LACT 0.4* 2.5*     No results for input(s): LIPASE in the last 168 hours.  No results for input(s): CHOL, HDL, LDL, TRIG, CHOLHDLRATIO in the last 168 hours.  No results for input(s): TSH in the last 168 hours.    Recent Labs  Lab 04/26/18  1612   TROPI <0.015     No results for input(s): COLOR, APPEARANCE, URINEGLC, URINEBILI, URINEKETONE, SG, UBLD, URINEPH, PROTEIN, UROBILINOGEN, NITRITE, LEUKEST, RBCU, WBCU in the last 168 hours.    No results found for this or any previous visit (from the past 24 hour(s)).

## 2018-04-29 NOTE — PLAN OF CARE
Problem: Patient Care Overview  Goal: Plan of Care/Patient Progress Review  Outcome: Improving  Pt up SBA. Ambulated in halls x2. Currently on 1L of O2 via NC. Pt dropped to 84% on room air, 88% on 2L, and 86% on 1L while ambulating. LS diminished, RLL & LLL crackles. Pt states she is feeling better. Xanax & percoset given x1. Receiving azithromycin and prednisone. D/C TBD.

## 2018-04-30 VITALS
WEIGHT: 171.3 LBS | TEMPERATURE: 98 F | HEIGHT: 64 IN | OXYGEN SATURATION: 92 % | BODY MASS INDEX: 29.24 KG/M2 | RESPIRATION RATE: 18 BRPM | DIASTOLIC BLOOD PRESSURE: 70 MMHG | HEART RATE: 87 BPM | SYSTOLIC BLOOD PRESSURE: 150 MMHG

## 2018-04-30 PROCEDURE — 25000125 ZZHC RX 250: Performed by: INTERNAL MEDICINE

## 2018-04-30 PROCEDURE — 94640 AIRWAY INHALATION TREATMENT: CPT

## 2018-04-30 PROCEDURE — 99239 HOSP IP/OBS DSCHRG MGMT >30: CPT | Performed by: INTERNAL MEDICINE

## 2018-04-30 PROCEDURE — 25000128 H RX IP 250 OP 636: Performed by: INTERNAL MEDICINE

## 2018-04-30 PROCEDURE — 25000132 ZZH RX MED GY IP 250 OP 250 PS 637: Mod: GY | Performed by: INTERNAL MEDICINE

## 2018-04-30 PROCEDURE — 94640 AIRWAY INHALATION TREATMENT: CPT | Mod: 76

## 2018-04-30 PROCEDURE — A9270 NON-COVERED ITEM OR SERVICE: HCPCS | Mod: GY | Performed by: INTERNAL MEDICINE

## 2018-04-30 PROCEDURE — 40000275 ZZH STATISTIC RCP TIME EA 10 MIN

## 2018-04-30 RX ORDER — ALBUTEROL SULFATE 90 UG/1
1-2 AEROSOL, METERED RESPIRATORY (INHALATION) EVERY 4 HOURS PRN
Qty: 1 INHALER | Refills: 0 | Status: SHIPPED | OUTPATIENT
Start: 2018-04-30

## 2018-04-30 RX ORDER — PREDNISONE 20 MG/1
TABLET ORAL
Qty: 11 TABLET | Refills: 0 | Status: SHIPPED | OUTPATIENT
Start: 2018-04-30

## 2018-04-30 RX ADMIN — ALPRAZOLAM 0.5 MG: 0.5 TABLET ORAL at 00:01

## 2018-04-30 RX ADMIN — IPRATROPIUM BROMIDE AND ALBUTEROL SULFATE 3 ML: .5; 3 SOLUTION RESPIRATORY (INHALATION) at 11:29

## 2018-04-30 RX ADMIN — OMEPRAZOLE 20 MG: 20 CAPSULE, DELAYED RELEASE ORAL at 08:23

## 2018-04-30 RX ADMIN — SODIUM CHLORIDE: 9 INJECTION, SOLUTION INTRAVENOUS at 06:24

## 2018-04-30 RX ADMIN — IPRATROPIUM BROMIDE AND ALBUTEROL SULFATE 3 ML: .5; 3 SOLUTION RESPIRATORY (INHALATION) at 15:23

## 2018-04-30 RX ADMIN — ALPRAZOLAM 0.5 MG: 0.5 TABLET ORAL at 08:23

## 2018-04-30 RX ADMIN — ALPRAZOLAM 0.5 MG: 0.5 TABLET ORAL at 16:28

## 2018-04-30 RX ADMIN — OXYCODONE AND ACETAMINOPHEN 1 TABLET: 10; 325 TABLET ORAL at 08:23

## 2018-04-30 RX ADMIN — IPRATROPIUM BROMIDE AND ALBUTEROL SULFATE 3 ML: .5; 3 SOLUTION RESPIRATORY (INHALATION) at 07:30

## 2018-04-30 RX ADMIN — OXYCODONE AND ACETAMINOPHEN 1 TABLET: 10; 325 TABLET ORAL at 16:30

## 2018-04-30 RX ADMIN — AZITHROMYCIN 250 MG: 250 TABLET, FILM COATED ORAL at 16:28

## 2018-04-30 RX ADMIN — OXYCODONE AND ACETAMINOPHEN 1 TABLET: 10; 325 TABLET ORAL at 00:01

## 2018-04-30 RX ADMIN — PREDNISONE 40 MG: 20 TABLET ORAL at 08:22

## 2018-04-30 ASSESSMENT — ACTIVITIES OF DAILY LIVING (ADL)
ADLS_ACUITY_SCORE: 11

## 2018-04-30 ASSESSMENT — PAIN DESCRIPTION - DESCRIPTORS: DESCRIPTORS: DULL;BURNING

## 2018-04-30 NOTE — PLAN OF CARE
Problem: Pneumonia (Adult)  Goal: Signs and Symptoms of Listed Potential Problems Will be Absent, Minimized or Managed (Pneumonia)  Signs and symptoms of listed potential problems will be absent, minimized or managed by discharge/transition of care (reference Pneumonia (Adult) CPG).   Outcome: Improving  Pt reports minimal SOB with exertion, continues to require oxygen 1 lpm via nc to maintain sats > 90%. Pt encouraged to use IS. Pt remains afebrile, reported pain 6/10 to shoulder, percocet 1 tab administered, effective.    Problem: Mood Alteration Comorbidity  Goal: Mood Alteration Comorbidity  Patient comorbidity will be monitored for signs and symptoms of Mood Alteration condition.  Problems will be absent, minimized or managed by discharge/transition of care.   Outcome: No Change  Pt reported feeling anxious, Xanax 0.5 mg administered x 1, effective.

## 2018-04-30 NOTE — PLAN OF CARE
Problem: Patient Care Overview  Goal: Plan of Care/Patient Progress Review  Patient is discharging home w/ transportation from . All belongings w/ patient. All d/c instructions provided. Pt is going home on home 02.

## 2018-04-30 NOTE — DISCHARGE INSTRUCTIONS
Your hospital follow up appointment has been schedule for you with Dr. Bell at [facility] for Wednesday 5/9/18 at 1:40pm. Please bring your hospital discharge instructions and any new medications, your insurance cared and photo ID with you to your appointment. Please call the clinic at [phone] if you need to reschedule.    Oxygen Provider:  Arranged through [employer], contact number [phone]. If you have any questions or concerns please call the oxygen company directly.

## 2018-04-30 NOTE — PROGRESS NOTES
Patient has been assessed for Home Oxygen needs.  Oxygen readings:   *   RA - at rest  Pulse oximetry SPO2 90 %  *   RA - during activity/with exercise SPO2 82 %  *   O2 at  1 liters/minute (at rest) ...SPO2 94 %  *   O2 at  1 liters/minute (during activity/with exercise) ...SPO2 92 %

## 2018-04-30 NOTE — PLAN OF CARE
Problem: Patient Care Overview  Goal: Plan of Care/Patient Progress Review  Outcome: Improving  Pt up SBA. Alert and oriented x4. Walked in vasquez x1, oxygen sat dropped to 82% on room air w/ activity. Currently on 1L via nasal cannula at 92%. Denies SOB, dyspnea w/ exertion at times. Frequent, productive cough. Green-pale sputum. LS lower lobes crackles, upper lobes exp wheezes. Reg diet, fair appetite. Voiding well. Receiving prednisone & azithromycin. Walking and IS use encouraged. D/C TBD.

## 2018-04-30 NOTE — PLAN OF CARE
Problem: Patient Care Overview  Goal: Plan of Care/Patient Progress Review  Outcome: No Change  Pt admitted on 04/26 for pneumonia. PO zithro. Independent, no dizziness. IVF at 75/hr. D/C TBD. Xanax and Percocet x1 at 1600. Tried weaning O2 but saturations dipped to 88%, bumped O2 up to 1L. Regular diet, tolerating well. Nursing will continue to monitor.

## 2018-04-30 NOTE — DISCHARGE SUMMARY
[facility]    Discharge Summary  Hospitalist    Date of Admission:  4/26/2018  Date of Discharge:  4/30/2018  Discharging Provider: Swati Gandara MD  Date of Service (when I saw the patient): 04/30/18    Discharge Diagnoses      1.COPD exacerbation.    2. possible pneumonia    3. Acute hypoxic respiratory failure secondary to above    4. Sepsis on admission    5. COPD exacerbation     6. Leukocytosis     7. GERD     8. Depression     9. History of tobacco use     History of Present Illness   Nayeli Blas is an 53 year old female patient who was admitted with shortness of breath and hypoxia.  Please see H&P for details    Hospital Course   Summary of Stay: Nayeli Blas is a 52 year old female who was admitted on 4/26/2018 for possible pneumonia.  Patient's past medical history significant for anxiety, depression, GERD and COPD presented with shortness of breath elevated leukocytosis requiring supplemental oxygen     #1.  Shortness of breath likely from COPD exacerbation, possible pneumonia.  Patient had chest x-ray on admission which showed some lingular infiltrate.  She was initially started on IV cefepime and azithromycin.  Pro-calcitonin was negative.  Cefepime was discontinued and completed course of azithromycin.  Influenza A and B-.  RSV negative. She was given supplemental oxygen and neb treatment.    #2. Acute hypoxic respiratory failure secondary to above. She was continued on oxygen supplement.     #3.  Sepsis on admission  --Patient was tachypneic with elevated leukocytosis and initially elevated lactic acidosis  --Received fluids and IV antibiotics.   --Sepsis resolved     #4.  COPD exacerbation  --Started on prednisone 60 mg daily and tapered to 40 mg daily. She was discharged on prednisone taper. Her oxygen saturation remained low with activity.  She was discharged on home oxygen. She was given prescription for albuterol inhaler.  She completed course of Zithromax during  hospital course.    #5.  Leukocytosis  --Likely from infection and use of steroids  -- procalc negative     #5.  GERD  --On omeprazole     #6.  Depression  --Citalopram held while on a azithromycin     #7.  History of tobacco use  --Counseled for smoking. Declined nicotine replacement therapy    Patient remained stable during hospital course.  She was discharged home with plan to follow-up with his primary care physician as outpatient.    Significant Results and Procedures   Results for orders placed or performed during the hospital encounter of 04/26/18   XR Chest 2 Views    Narrative    XR CHEST 2 VW 4/26/2018 6:55 PM    HISTORY: Cough, bilateral pneumonia.    COMPARISON: None.    FINDINGS: Diffuse prominence of the background interstitium with more  discrete consolidation in the lingula. No pleural effusion. No  pneumothorax. Normal heart size.      Impression    IMPRESSION: Appearance suggests viral pneumonia.    STARLA HAWLEY MD     Pending Results   None  Code Status   Full Code       Primary Care Physician   ADDIE MALONEY    Discharge Disposition   Discharged to home  Condition at discharge: Stable    Consultations This Hospital Stay   CARE COORDINATOR IP CONSULT    Time Spent on this Encounter   I, Swati Gandara MD, personally saw the patient today and spent greater than 30 minutes discharging this patient.    Discharge Orders     Reason for your hospital stay   Shortness of breath and wheezing     Follow-up and recommended labs and tests    Follow up with primary care provider, ADDIE MALONEY, within 7 days     Activity   Your activity upon discharge: activity as tolerated     Full Code     Oxygen Adult   [facility] Order 1-2 liter(s) by nasal cannula with activity with use of portable tank. Expected treatment length is 1 months.. Test on conserving device as applicable.    Patients who qualify for home O2 coverage under the CMS guidelines require ABG tests or O2 sat readings  obtained closest to, but no earlier than 2 days prior to the discharge, as evidence of the need for home oxygen therapy. Testing must be performed while patient is in the chronic stable state. See notes for O2 sats.    I certify that this patient, Nayeli Blas has been under my care and that I, or a nurse practitioner or physician's assistant working with me, had a face-to-face encounter that meets the face-to-face encounter requirements with this patient on 4/30/2018. The patient, Nayeli Blas was evaluated or treated in whole, or in part, for the following medical condition, which necessitates the use of the ordered oxygen. Treatment Diagnosis: Acute respiratory failure with hypoxia. suspect COPD     Attending Provider: Swati Gandara MD  Physician signature: See electronic signature associated with these discharge orders  Date of Order: April 30, 2018     Diet   Follow this diet upon discharge: Orders Placed This Encounter     Combination Diet Regular Diet Adult       Discharge Medications   Current Discharge Medication List      START taking these medications    Details   predniSONE (DELTASONE) 20 MG tablet Take  2 tabs (40 mg) daily x 3 days, 1 tab (20 mg) daily x 3 days, then 1/2 tab (10 mg) x 3 days.  Qty: 11 tablet, Refills: 0    Associated Diagnoses: Acute respiratory failure with hypoxia (H)         CONTINUE these medications which have CHANGED    Details   albuterol (PROAIR HFA/PROVENTIL HFA/VENTOLIN HFA) 108 (90 Base) MCG/ACT Inhaler Inhale 1-2 puffs into the lungs every 4 hours as needed for shortness of breath / dyspnea or wheezing  Qty: 1 Inhaler, Refills: 0    Associated Diagnoses: Acute respiratory failure with hypoxia (H)         CONTINUE these medications which have NOT CHANGED    Details   ALPRAZolam (XANAX PO) Take 0.5 mg by mouth 4 times daily as needed for anxiety      Citalopram Hydrobromide (CELEXA PO) Take 30 mg by mouth daily      Cyclobenzaprine HCl (FLEXERIL PO) Take 20  mg by mouth At Bedtime      fluticasone (FLONASE) 50 MCG/ACT spray Spray 2 sprays into both nostrils 2 times daily as needed for rhinitis or allergies      naloxone (NARCAN) nasal spray Spray 4 mg into one nostril alternating nostrils as needed for opioid reversal every 2-3 minutes until assistance arrives      Omeprazole (PRILOSEC PO) Take 20 mg by mouth every morning      oxyCODONE-acetaminophen (PERCOCET)  MG per tablet Take 1 tablet by mouth every 8 hours as needed for severe pain      QUEtiapine Fumarate (SEROQUEL PO) Take 400 mg by mouth At Bedtime           # Pain Assessment:  Current Pain Score 4/30/2018   Patient currently in pain? yes   Pain score (0-10) 2   Pain location -   Pain descriptors -   Nayeli rowan pain level was assessed and she currently denies pain.      Allergies   Allergies   Allergen Reactions     Sulfa Drugs Anaphylaxis     Penicillins Rash     Data   Most Recent 3 CBC's:  Recent Labs   Lab Test  04/29/18   0629  04/28/18   0630  04/27/18   0627   WBC  11.0  19.5*  18.0*   HGB  10.8*  11.6*  12.6   MCV  98  97  96   PLT  471*  587*  594*      Most Recent 3 BMP's:  Recent Labs   Lab Test  04/29/18   0629  04/28/18   0630  04/27/18   0627   NA  144  140  139   POTASSIUM  3.7  3.6  4.3   CHLORIDE  109  106  105   CO2  28  26  26   BUN  7  12  12   CR  0.64  0.58  0.54   ANIONGAP  7  8  8   MACK  8.1*  8.3*  8.0*   GLC  77  80  113*     Most Recent 2 LFT's:No lab results found.  Most Recent INR's and Anticoagulation Dosing History:  Anticoagulation Dose History     There is no flowsheet data to display.        Most Recent 3 Troponin's:  Recent Labs   Lab Test  04/26/18   1612   TROPI  <0.015     Most Recent Cholesterol Panel:No lab results found.  Most Recent 6 Bacteria Isolates From Any Culture (See EPIC Reports for Culture Details):  Recent Labs   Lab Test  04/27/18   1033  04/26/18   1809  04/26/18   1615   CULT  Light growth  Normal clarita    No growth after 4 days  No growth after 4  days     Most Recent TSH, T4 and A1c Labs:No lab results found.

## 2018-05-01 NOTE — PROGRESS NOTES
Transition Communication Hand-off for Care Transitions to Next Level of Care Provider    Name: Nayeli Blas  : 1965  MRN #: [id number]  Primary Care Provider: ADDIE BELL  Primary Care MD Name: Addie Bell  Primary Clinic: [facility] [address] [place] [address]  Primary Care [facility] Name: [facility] [place]  Reason for Hospitalization:  COPD exacerbation (H) [J44.1]  Acute respiratory failure with hypoxia (H) [J96.01]  Pneumonia of both lungs due to infectious organism, unspecified part of lung [J18.9]  Admit Date/Time: 2018  3:36 PM  Discharge Date: 18  Payor Source: Payor: MEDICARE / Plan: MEDICARE / Product Type: Medicare /     Readmission Assessment Measure (CHERELLE) Risk Score/category: AVERAGE           Reason for Communication Hand-off Referral: Admission diagnoses: PN  Admission diagnoses: COPD  Non-adherence to plan of care related to:  Other declined smoking cessation    Discharge Plan:       Concern for non-adherence with plan of care:   YES  Discharge Needs Assessment:  Needs       Most Recent Value    # of Referrals Placed by CTS Scheduled Follow-up appointments          Already enrolled in Tele-monitoring program and name of program:  na  Follow-up specialty is recommended: No    Follow-up plan:  No future appointments.    Any outstanding tests or procedures:    Procedures     Future Labs/Procedures    Oxygen Adult     Comments:    [facility] Order 1-2 liter(s) by nasal cannula with activity with use of portable tank. Expected treatment length is 1 months.. Test on conserving device as applicable.    Patients who qualify for home O2 coverage under the CMS guidelines require ABG tests or O2 sat readings obtained closest to, but no earlier than 2 days prior to the discharge, as evidence of the need for home oxygen therapy. Testing must be performed while patient is in the chronic stable state. See notes for O2 sats.    I certify that this patient, Nayeli  RACH Blas has been under my care and that I, or a nurse practitioner or physician's assistant working with me, had a face-to-face encounter that meets the face-to-face encounter requirements with this patient on 4/30/2018. The patient, Nayeli Blas was evaluated or treated in whole, or in part, for the following medical condition, which necessitates the use of the ordered oxygen. Treatment Diagnosis: Acute respiratory failure with hypoxia. suspect COPD     Attending Provider: Swati Gandara MD  Physician signature: See electronic signature associated with these discharge orders  Date of Order: April 30, 2018              Follow-up and recommended labs and tests        Follow up with primary care provider, ADDIE MALONEY, within 7 days                     Further instructions from your care team      Your hospital follow up appointment has been schedule for you with Dr. Maloney at [facility] for Wednesday 5/9/18 at 1:40pm. Please bring your hospital discharge instructions and any new medications, your insurance cared and photo ID with you to your appointment. Please call the clinic at [phone] if you need to reschedule.     Oxygen Provider:  Arranged through [employer], contact number [phone]. If you have any questions or concerns please call the oxygen company directly       Key Recommendations: pt admitted with pna/copd exacerbation. Pt states she had gone into her PCP clinic when she was first sick but saw an on call doctor and wasn't prescribed any antibiotics. Pt has a history of COPD and is a smoker, although she states she hasn't smoked in about a month and plans to quit. PNA and COPD action plan were reviewed and given to the pt. Pt states she doesn't have any standing orders for antibiotics or steroids for when she has a flare up, maybe this could be a possibility for the future to hopefully prevent another hospital admission? dc'd on [place]  02 through [facility].     Natalie Linda    AVS/Discharge Summary is the source of truth; this is a helpful guide for improved communication of patient story

## 2018-05-02 LAB
BACTERIA SPEC CULT: NO GROWTH
BACTERIA SPEC CULT: NO GROWTH
Lab: NORMAL
Lab: NORMAL
SPECIMEN SOURCE: NORMAL
SPECIMEN SOURCE: NORMAL